# Patient Record
Sex: MALE | Race: WHITE | NOT HISPANIC OR LATINO | URBAN - METROPOLITAN AREA
[De-identification: names, ages, dates, MRNs, and addresses within clinical notes are randomized per-mention and may not be internally consistent; named-entity substitution may affect disease eponyms.]

---

## 2019-03-18 ENCOUNTER — OUTPATIENT (OUTPATIENT)
Dept: OUTPATIENT SERVICES | Facility: HOSPITAL | Age: 34
LOS: 1 days | End: 2019-03-18
Payer: COMMERCIAL

## 2019-03-18 DIAGNOSIS — E80.6 OTHER DISORDERS OF BILIRUBIN METABOLISM: ICD-10-CM

## 2019-03-18 PROCEDURE — 76857 US EXAM PELVIC LIMITED: CPT | Mod: 26

## 2019-03-18 PROCEDURE — 76700 US EXAM ABDOM COMPLETE: CPT | Mod: 26

## 2019-03-18 PROCEDURE — 76700 US EXAM ABDOM COMPLETE: CPT

## 2019-03-18 PROCEDURE — 76857 US EXAM PELVIC LIMITED: CPT

## 2020-03-09 PROBLEM — Z00.00 ENCOUNTER FOR PREVENTIVE HEALTH EXAMINATION: Status: ACTIVE | Noted: 2020-03-09

## 2020-03-10 ENCOUNTER — TRANSCRIPTION ENCOUNTER (OUTPATIENT)
Age: 35
End: 2020-03-10

## 2020-03-10 ENCOUNTER — APPOINTMENT (OUTPATIENT)
Dept: ORTHOPEDIC SURGERY | Facility: CLINIC | Age: 35
End: 2020-03-10
Payer: COMMERCIAL

## 2020-03-10 DIAGNOSIS — Z78.9 OTHER SPECIFIED HEALTH STATUS: ICD-10-CM

## 2020-03-10 DIAGNOSIS — M25.561 PAIN IN RIGHT KNEE: ICD-10-CM

## 2020-03-10 PROCEDURE — 99203 OFFICE O/P NEW LOW 30 MIN: CPT

## 2020-03-10 NOTE — HISTORY OF PRESENT ILLNESS
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 He has been having increasing pain in his right knee for some time. He has tried multiple modalities of conservative therapy including anti-inflammatories, physical therapy and rest. Despite this he continues to have pain. It is worse with sitting for long periods of time. It is relieved with rest. The patient states the pain is made worse with activity and relieved with rest.  Sharp, 5/10 [Bending] : worsened by bending [Lifting] : worsened by lifting [NSAIDs] : relieved by nonsteroidal anti-inflammatory drugs [Recumbency] : relieved by recumbency [Rest] : relieved by rest

## 2020-03-10 NOTE — DISCUSSION/SUMMARY
[de-identified] : 34-year-old male with likely ligamentous knee injury or internal derangement. Due to his failure of relative conservative therapy, I would recommend an MRI for further evaluation. He may have patellofemoral syndrome and may benefit from a corticosteroid injection, but we want to ensure there were no ligamentous injuries that may require in surgical care prior to proceeding with additional treatment. After the MRI is complete, he will give us a call for further discussion of plan of care.  The patient was given the opportunity to ask questions and all questions were answered to their satisfaction.\par \par Cuong Mclaughlin MD\par Orthopaedic Trauma Surgeon\par [facility] [place]\par Director Orthopaedic Trauma, [facility]\par \par \par \par \par

## 2020-03-10 NOTE — PHYSICAL EXAM
[de-identified] : Physical Exam:\par General: Well appearing, no acute distress, A&O\par Neurologic: A&Ox3, No focal deficits\par Head: NCAT without abrasions, lacerations, or ecchymosis to head, face, or scalp\par Respiratory: Equal chest wall expansion bilaterally, no accessory muscle use\par Lymphatic: No lymphadenopathy palpated\par Skin: Warm and dry\par Psychiatric: Normal mood and affect\par \par RLE:\par SILT s/s/sp/dp/t\par Fires EHL/FHL/GS/TA\par 2+ DP/PT pulse\par brisk capillary refill\par Moderate tenderness to palpation anterior knee. Positive pain with patellofemoral compression. No medial or lateral joint line tenderness. No instability to varus, valgus, anterior drawer, posterior drawer stress testing.

## 2020-03-16 ENCOUNTER — APPOINTMENT (OUTPATIENT)
Dept: MRI IMAGING | Facility: CLINIC | Age: 35
End: 2020-03-16

## 2020-04-26 ENCOUNTER — MESSAGE (OUTPATIENT)
Age: 35
End: 2020-04-26

## 2020-05-01 ENCOUNTER — APPOINTMENT (OUTPATIENT)
Dept: DISASTER EMERGENCY | Facility: HOSPITAL | Age: 35
End: 2020-05-01

## 2020-05-02 LAB
SARS-COV-2 IGG SERPL IA-ACNC: 0.3 INDEX
SARS-COV-2 IGG SERPL QL IA: NEGATIVE

## 2020-05-05 ENCOUNTER — APPOINTMENT (OUTPATIENT)
Dept: DISASTER EMERGENCY | Facility: HOSPITAL | Age: 35
End: 2020-05-05

## 2021-03-28 ENCOUNTER — EMERGENCY (EMERGENCY)
Facility: HOSPITAL | Age: 36
LOS: 1 days | Discharge: HOME | End: 2021-03-28
Payer: COMMERCIAL

## 2021-03-28 VITALS
RESPIRATION RATE: 16 BRPM | HEART RATE: 63 BPM | TEMPERATURE: 98 F | HEIGHT: 77 IN | DIASTOLIC BLOOD PRESSURE: 63 MMHG | SYSTOLIC BLOOD PRESSURE: 101 MMHG | OXYGEN SATURATION: 99 %

## 2021-03-28 LAB
FLU A RESULT: SIGNIFICANT CHANGE UP
FLU A RESULT: SIGNIFICANT CHANGE UP
FLUAV AG NPH QL: SIGNIFICANT CHANGE UP
FLUBV AG NPH QL: SIGNIFICANT CHANGE UP
RSV RESULT: SIGNIFICANT CHANGE UP
RSV RNA RESP QL NAA+PROBE: SIGNIFICANT CHANGE UP
SARS-COV-2 RNA SPEC QL NAA+PROBE: SIGNIFICANT CHANGE UP

## 2021-03-28 PROCEDURE — 99284 EMERGENCY DEPT VISIT MOD MDM: CPT

## 2021-03-28 PROCEDURE — 87631 RESP VIRUS 3-5 TARGETS: CPT

## 2021-03-28 PROCEDURE — U0005: CPT

## 2021-03-28 PROCEDURE — U0003: CPT

## 2021-03-28 PROCEDURE — 99283 EMERGENCY DEPT VISIT LOW MDM: CPT

## 2021-03-28 NOTE — ED PROVIDER NOTE - OBJECTIVE STATEMENT
Pt is presenting to the ER for covid swab. Pt reports cough this morning and someone at home has + covid. Denies fevers chills, loss of taste or smell, denies URI symptoms, denies chest pain or shortness of breath, denies nausea vomiting diarrhea or abdominal pain, and denies weakness or fatigue. Pt requesting testing at this time. [x] known exposure [] no-known exposure

## 2021-03-28 NOTE — ED PROVIDER NOTE - CLINICAL SUMMARY MEDICAL DECISION MAKING FREE TEXT BOX
Pt nontoxic appearing, stable vitals, ambulatory with stable saturation without supplemental oxygen. PT does not meet criteria listed in most updated guidelines as per [facility] protocol/algorithm for admission at this time. pt advised about self-quarantine instructions until negative test results and/or symptom resolution. pt advised on hand hygiene, monitoring of symptoms, antipyretic use as well as and fu with primary care provider. Instructions given in pre-printed copy.

## 2021-03-28 NOTE — ED PROVIDER NOTE - NS ED ROS FT
+ cough  Denies fever, chills, fatigue, and weight loss. Denies HA, Dizziness. ENMT: Denies URI symptoms, difficulty swallowing, sore throat, loss of taste or smell. CARDIO: Denies CP, palpitations, edema. RESP: Denies SOB, Diff breathing, hemoptysis. GI: Denies N/V, ABD pain, change in bowel movement.. MS: Denies joint pain, back pain, weakness, decreased ROM, swelling. NEURO: Denies change in gait, seizures, loss of sensation, dizziness, confusion LOC. SKIN: denies rash or discoloration

## 2021-03-28 NOTE — ED PROVIDER NOTE - PATIENT PORTAL LINK FT
You can access the FollowMyHealth Patient Portal offered by [facility] by registering at the following website: [email or web address]. By joining [facility]’s FollowMyHealth portal, you will also be able to view your health information using other applications (apps) compatible with our system.

## 2022-03-03 ENCOUNTER — APPOINTMENT (OUTPATIENT)
Dept: ORTHOPEDIC SURGERY | Facility: CLINIC | Age: 37
End: 2022-03-03
Payer: COMMERCIAL

## 2022-03-03 DIAGNOSIS — S93.491A SPRAIN OF OTHER LIGAMENT OF RIGHT ANKLE, INITIAL ENCOUNTER: ICD-10-CM

## 2022-03-03 PROCEDURE — 99213 OFFICE O/P EST LOW 20 MIN: CPT

## 2022-03-03 NOTE — HISTORY OF PRESENT ILLNESS
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  He comes in today for evaluation.  He has been ambulating in a cam boot but finds it somewhat cumbersome.  The patient states the pain is made worse with activity and relieved with rest.  Sharp, 3 out of 10 [3] : a current pain level of 3/10 [Walking] : walking [Bending] : worsened by bending [Lifting] : worsened by lifting [Weight Bearing] : worsened by weight bearing [Recumbency] : relieved by recumbency [Rest] : relieved by rest

## 2022-03-03 NOTE — DISCUSSION/SUMMARY
[de-identified] : 36-year-old male with right ankle sprain.  The patient presents with an acute inversion injury to the right ankle with an injury to the anterior lateral ligamentous complex. I outlined a treatment protocol that will require relative rest over the next 2 weeks. In addition, I discussed the spectrum of sprain injuries; the majority of which responded well to nonoperative modalities of treatment, and the possible indication for surgical management if the ankle becomes chronically and grossly unstable. Nonoperative modalities of treatment include relative rest over the next 2 weeks, NSAID's, ice, compressive wraps and gradual return to weight bearing.\par \par As he is still having some issues, he was given a lace up ankle brace today for some support that he can wear while he works.  If he is still having trouble in a couple weeks, we would likely recommend formal physical therapy for balance/proprioceptive training if he is still struggling with his injury. At this time, radiographs show no evidence of fracture, and I have recommended weightbearing as tolerated. We'll see the patient back as needed to determine further treatment modalities if needed.\par \par The patient was given the opportunity to ask questions and all questions were answered to their satisfaction.\par \par Cuong Mclaughlin MD\par Orthopaedic Trauma Surgeon\par [facility]\par [facility] [place]\par Director Orthopaedic Trauma, [facility]\par \par \par \par

## 2022-03-03 NOTE — PHYSICAL EXAM
[de-identified] : Physical Exam:\par General: Well appearing, no acute distress, A&O\par Neurologic: A&Ox3, No focal deficits\par Head: NCAT without abrasions, lacerations, or ecchymosis to head, face, or scalp\par Respiratory: Equal chest wall expansion bilaterally, no accessory muscle use\par Lymphatic: No lymphadenopathy palpated\par Skin: Warm and dry\par Psychiatric: Normal mood and affect\par \par RLE:\par SILT s/s/sp/dp/t\par Fires EHL/FHL/GS/TA\par 2+ DP/PT pulse\par brisk capillary refill\par Moderate tenderness to palpation anterolateral ankle.  [de-identified] : no imaging today

## 2022-07-22 ENCOUNTER — APPOINTMENT (OUTPATIENT)
Dept: SURGERY | Facility: CLINIC | Age: 37
End: 2022-07-22

## 2022-07-22 VITALS
BODY MASS INDEX: 24.79 KG/M2 | HEIGHT: 77 IN | HEART RATE: 66 BPM | OXYGEN SATURATION: 99 % | SYSTOLIC BLOOD PRESSURE: 136 MMHG | WEIGHT: 210 LBS | DIASTOLIC BLOOD PRESSURE: 70 MMHG

## 2022-07-22 DIAGNOSIS — K42.9 UMBILICAL HERNIA W/OUT OBSTRUCTION OR GANGRENE: ICD-10-CM

## 2022-07-22 PROCEDURE — 99204 OFFICE O/P NEW MOD 45 MIN: CPT

## 2022-07-22 NOTE — PHYSICAL EXAM
[Normal Thyroid] : the thyroid was normal [Normal Breath Sounds] : Normal breath sounds [Normal Heart Sounds] : normal heart sounds [Normal Rate and Rhythm] : normal rate and rhythm [No HSM] : no hepatosplenomegaly [No Rash or Lesion] : No rash or lesion [Alert] : alert [Oriented to Person] : oriented to person [Oriented to Place] : oriented to place [Oriented to Time] : oriented to time [Calm] : calm [de-identified] : well-appearing, NAD [de-identified] : ERIC [de-identified] : soft, ND, NT, +BS, +1cm reducible umbilical hernia [de-identified] : no CVAT [de-identified] : full ROM

## 2022-07-22 NOTE — HISTORY OF PRESENT ILLNESS
[de-identified] : 37yo Male with h/o hypothyroidism, mild ANTOINE, presents for evaluation of umbilical hernia. States that he has had the hernia for years and he is always able to reduce it. No episodes of incarceration or bowel obstruction. No previous surgery.\par No tobacco use. No DM.\par No immunosuppression.\par No chronic cough.

## 2022-07-22 NOTE — ASSESSMENT
[FreeTextEntry1] : 36M with symptomatic, reducible 1cm umbilical hernia. Recommend open primary repair as an outpatient.

## 2022-07-22 NOTE — REVIEW OF SYSTEMS
[Fever] : no fever [Chills] : no chills [Eye Pain] : no eye pain [Red Eyes] : eyes not red [Earache] : no earache [Loss Of Hearing] : no hearing loss [Nosebleeds] : no nosebleeds [Heart Rate Is Slow] : the heart rate was not slow [Heart Rate Is Fast] : the heart rate was not fast [Chest Pain] : no chest pain [Palpitations] : no palpitations [Shortness Of Breath] : no shortness of breath [Wheezing] : no wheezing [Cough] : no cough [SOB on Exertion] : no shortness of breath during exertion [Abdominal Pain] : no abdominal pain [Vomiting] : no vomiting [Constipation] : no constipation [Diarrhea] : no diarrhea [Heartburn] : no heartburn [Dysuria] : no dysuria [Incontinence] : no incontinence [Joint Swelling] : no joint swelling [Joint Stiffness] : no joint stiffness [Skin Lesions] : no skin lesions [Skin Wound] : no skin wound [Confused] : no confusion [Convulsions] : no convulsions [Suicidal] : not suicidal [Sleep Disturbances] : no sleep disturbances [Proptosis] : no proptosis [Hot Flashes] : no hot flashes [Easy Bleeding] : no tendency for easy bleeding [Easy Bruising] : no tendency for easy bruising

## 2022-08-12 ENCOUNTER — OUTPATIENT (OUTPATIENT)
Dept: OUTPATIENT SERVICES | Facility: HOSPITAL | Age: 37
LOS: 1 days | End: 2022-08-12
Payer: COMMERCIAL

## 2022-08-12 VITALS
HEART RATE: 76 BPM | HEIGHT: 77.5 IN | SYSTOLIC BLOOD PRESSURE: 108 MMHG | WEIGHT: 216.93 LBS | RESPIRATION RATE: 20 BRPM | OXYGEN SATURATION: 99 % | DIASTOLIC BLOOD PRESSURE: 60 MMHG

## 2022-08-12 DIAGNOSIS — Z29.9 ENCOUNTER FOR PROPHYLACTIC MEASURES, UNSPECIFIED: ICD-10-CM

## 2022-08-12 DIAGNOSIS — Z91.89 OTHER SPECIFIED PERSONAL RISK FACTORS, NOT ELSEWHERE CLASSIFIED: ICD-10-CM

## 2022-08-12 DIAGNOSIS — Z98.890 OTHER SPECIFIED POSTPROCEDURAL STATES: Chronic | ICD-10-CM

## 2022-08-12 DIAGNOSIS — G47.33 OBSTRUCTIVE SLEEP APNEA (ADULT) (PEDIATRIC): ICD-10-CM

## 2022-08-12 DIAGNOSIS — K42.9 UMBILICAL HERNIA WITHOUT OBSTRUCTION OR GANGRENE: ICD-10-CM

## 2022-08-12 DIAGNOSIS — Z01.818 ENCOUNTER FOR OTHER PREPROCEDURAL EXAMINATION: ICD-10-CM

## 2022-08-12 DIAGNOSIS — E03.9 HYPOTHYROIDISM, UNSPECIFIED: ICD-10-CM

## 2022-08-12 LAB
A1C WITH ESTIMATED AVERAGE GLUCOSE RESULT: 5.3 % — SIGNIFICANT CHANGE UP (ref 4–5.6)
ALBUMIN SERPL ELPH-MCNC: 4.6 G/DL — SIGNIFICANT CHANGE UP (ref 3.3–5.2)
ALP SERPL-CCNC: 57 U/L — SIGNIFICANT CHANGE UP (ref 40–120)
ALT FLD-CCNC: 21 U/L — SIGNIFICANT CHANGE UP
ANION GAP SERPL CALC-SCNC: 10 MMOL/L — SIGNIFICANT CHANGE UP (ref 5–17)
APTT BLD: 28.6 SEC — SIGNIFICANT CHANGE UP (ref 27.5–35.5)
AST SERPL-CCNC: 23 U/L — SIGNIFICANT CHANGE UP
BASOPHILS # BLD AUTO: 0.03 K/UL — SIGNIFICANT CHANGE UP (ref 0–0.2)
BASOPHILS NFR BLD AUTO: 0.4 % — SIGNIFICANT CHANGE UP (ref 0–2)
BILIRUB SERPL-MCNC: 1.4 MG/DL — SIGNIFICANT CHANGE UP (ref 0.4–2)
BLD GP AB SCN SERPL QL: SIGNIFICANT CHANGE UP
BUN SERPL-MCNC: 26.4 MG/DL — HIGH (ref 8–20)
CALCIUM SERPL-MCNC: 9.5 MG/DL — SIGNIFICANT CHANGE UP (ref 8.4–10.5)
CHLORIDE SERPL-SCNC: 100 MMOL/L — SIGNIFICANT CHANGE UP (ref 98–107)
CO2 SERPL-SCNC: 29 MMOL/L — SIGNIFICANT CHANGE UP (ref 22–29)
CREAT SERPL-MCNC: 1.23 MG/DL — SIGNIFICANT CHANGE UP (ref 0.5–1.3)
EGFR: 79 ML/MIN/1.73M2 — SIGNIFICANT CHANGE UP
EOSINOPHIL # BLD AUTO: 0.18 K/UL — SIGNIFICANT CHANGE UP (ref 0–0.5)
EOSINOPHIL NFR BLD AUTO: 2.6 % — SIGNIFICANT CHANGE UP (ref 0–6)
ESTIMATED AVERAGE GLUCOSE: 105 MG/DL — SIGNIFICANT CHANGE UP (ref 68–114)
GLUCOSE SERPL-MCNC: 95 MG/DL — SIGNIFICANT CHANGE UP (ref 70–99)
HCT VFR BLD CALC: 44.3 % — SIGNIFICANT CHANGE UP (ref 39–50)
HGB BLD-MCNC: 14.9 G/DL — SIGNIFICANT CHANGE UP (ref 13–17)
IMM GRANULOCYTES NFR BLD AUTO: 0.4 % — SIGNIFICANT CHANGE UP (ref 0–1.5)
INR BLD: 1.16 RATIO — SIGNIFICANT CHANGE UP (ref 0.88–1.16)
LYMPHOCYTES # BLD AUTO: 1.67 K/UL — SIGNIFICANT CHANGE UP (ref 1–3.3)
LYMPHOCYTES # BLD AUTO: 24.5 % — SIGNIFICANT CHANGE UP (ref 13–44)
MCHC RBC-ENTMCNC: 30.3 PG — SIGNIFICANT CHANGE UP (ref 27–34)
MCHC RBC-ENTMCNC: 33.6 GM/DL — SIGNIFICANT CHANGE UP (ref 32–36)
MCV RBC AUTO: 90 FL — SIGNIFICANT CHANGE UP (ref 80–100)
MONOCYTES # BLD AUTO: 0.48 K/UL — SIGNIFICANT CHANGE UP (ref 0–0.9)
MONOCYTES NFR BLD AUTO: 7 % — SIGNIFICANT CHANGE UP (ref 2–14)
NEUTROPHILS # BLD AUTO: 4.44 K/UL — SIGNIFICANT CHANGE UP (ref 1.8–7.4)
NEUTROPHILS NFR BLD AUTO: 65.1 % — SIGNIFICANT CHANGE UP (ref 43–77)
PLATELET # BLD AUTO: 184 K/UL — SIGNIFICANT CHANGE UP (ref 150–400)
POTASSIUM SERPL-MCNC: 4 MMOL/L — SIGNIFICANT CHANGE UP (ref 3.5–5.3)
POTASSIUM SERPL-SCNC: 4 MMOL/L — SIGNIFICANT CHANGE UP (ref 3.5–5.3)
PROT SERPL-MCNC: 7 G/DL — SIGNIFICANT CHANGE UP (ref 6.6–8.7)
PROTHROM AB SERPL-ACNC: 13.5 SEC — HIGH (ref 10.5–13.4)
RBC # BLD: 4.92 M/UL — SIGNIFICANT CHANGE UP (ref 4.2–5.8)
RBC # FLD: 12.4 % — SIGNIFICANT CHANGE UP (ref 10.3–14.5)
SODIUM SERPL-SCNC: 139 MMOL/L — SIGNIFICANT CHANGE UP (ref 135–145)
T3 SERPL-MCNC: 71 NG/DL — LOW (ref 80–200)
T4 AB SER-ACNC: 7.8 UG/DL — SIGNIFICANT CHANGE UP (ref 4.5–12)
TSH SERPL-MCNC: 0.85 UIU/ML — SIGNIFICANT CHANGE UP (ref 0.27–4.2)
WBC # BLD: 6.83 K/UL — SIGNIFICANT CHANGE UP (ref 3.8–10.5)
WBC # FLD AUTO: 6.83 K/UL — SIGNIFICANT CHANGE UP (ref 3.8–10.5)

## 2022-08-12 PROCEDURE — G0463: CPT

## 2022-08-12 RX ORDER — MUPIROCIN 20 MG/G
1 OINTMENT TOPICAL
Qty: 1 | Refills: 0
Start: 2022-08-12 | End: 2022-08-16

## 2022-08-12 NOTE — H&P PST ADULT - PROBLEM SELECTOR PLAN 3
Medical management as indicated. Pt. states he is in the process of finding PCP. Surgical team to follow.

## 2022-08-12 NOTE — H&P PST ADULT - GASTROINTESTINAL
negative small umbilical hernia/normal/soft/nontender/nondistended/normal active bowel sounds/no guarding/no rigidity/no organomegaly/no palpable keren/no masses palpable details…

## 2022-08-12 NOTE — H&P PST ADULT - MUSCULOSKELETAL
details… normal/ROM intact/no joint swelling/no joint erythema/no joint warmth/no calf tenderness/normal gait/strength 5/5 bilateral upper extremities/strength 5/5 bilateral lower extremities

## 2022-08-12 NOTE — H&P PST ADULT - NSICDXPASTMEDICALHX_GEN_ALL_CORE_FT
PAST MEDICAL HISTORY:  Hypothyroidism     Obstructive sleep apnea CPAP     PAST MEDICAL HISTORY:  COVID-19 vaccine series completed     Hypothyroidism     Obstructive sleep apnea CPAP    Umbilical hernia without obstruction or gangrene

## 2022-08-12 NOTE — H&P PST ADULT - NEUROLOGICAL
negative normal/cranial nerves II-XII intact/sensation intact/responds to verbal commands/cranial nerves intact/no spontaneous movement/superficial reflexes intact

## 2022-08-12 NOTE — H&P PST ADULT - NEGATIVE MUSCULOSKELETAL SYMPTOMS
no arthralgia/no arthritis/no joint swelling/no myalgia/no muscle weakness/no stiffness/no neck pain/no arm pain L/no arm pain R/no back pain/no leg pain L/no leg pain R

## 2022-08-12 NOTE — H&P PST ADULT - PROBLEM SELECTOR PLAN 5
ORT = 1. Surgical team to follow. Medical management as indicated. Pt. states he is in the process of finding PCP

## 2022-08-12 NOTE — H&P PST ADULT - PROBLEM SELECTOR PLAN 2
Open umbilical hernia repair with  Dr. Filippo Ellsworth on 8/17/22 secondary to umbilical hernia without obstruction or gangrene.

## 2022-08-12 NOTE — H&P PST ADULT - NSICDXFAMILYHX_GEN_ALL_CORE_FT
FAMILY HISTORY:  Mother  Still living? Yes, Estimated age: Age Unknown  FH: atrial fibrillation, Age at diagnosis: Age Unknown  FH: breast cancer, Age at diagnosis: Age Unknown  FH: hypertension, Age at diagnosis: Age Unknown

## 2022-08-12 NOTE — H&P PST ADULT - HISTORY OF PRESENT ILLNESS
37 y/o male presents today to PST pending open umbilical hernia repair with  Dr. Filippo Ellsworth on 8/17/22 secondary to umbilical hernia without obstruction or gangrene. Pt. states he has had hernia for a few years getting slightly worse over past few years and that it has been worsening since then and he has decided to pursue [id number] procedure. Pt. states he has ANTOINE, on CPAP, hypothyroidism on synthroid. Pt. denies incontinence of bowel or bladder.

## 2022-08-12 NOTE — H&P PST ADULT - RESPIRATORY
normal/clear to auscultation bilaterally/no wheezes/no rales/no rhonchi/no respiratory distress/no use of accessory muscles/airway patent/breath sounds equal/good air movement/respirations non-labored/no intercostal retractions

## 2022-08-12 NOTE — H&P PST ADULT - ASSESSMENT
37 y/o male presents today to PST pending open umbilical hernia repair with  Dr. Filippo Ellsworth on 22 secondary to umbilical hernia without obstruction or gangrene. Pt. states he has had hernia for a few years getting slightly worse over past few years and that it has been worsening since then and he has decided to pursue aforementioned procedure. Pt. states he has ANTOINE, on CPAP, hypothyroidism on synthroid. Pt. denies incontinence of bowel or bladder.   BMI 25.4.    Pt. aware that if any significant abnormalities come back on labs he would need medical clearance, pt. verbalized agreement and understanding.   Pt. educated and instructed regarding all preoperative instructions and education as per policy via both verbal and written means of communication and pt. verbalized agreement and understanding.  Pt. advised mupirocin ointment to be e-scribed to pharmacy prophylactically as documented and ordered, if MRSA/MSSA negative it can be stopped, and pt. verbalized agreement and understanding.  Patient educated on surgical scrub, COVID testing-done today in PST and pending, preadmission instructions, and day of procedure medications as per policy and pt. verbalizes understanding and agreement.    OPIOID RISK TOOL    HERBERT EACH BOX THAT APPLIES AND ADD TOTALS AT THE END    FAMILY HISTORY OF SUBSTANCE ABUSE                 FEMALE         MALE                                                Alcohol                             [  ]1 pt          [  ]3pts                                               Illegal Durgs                     [  ]2 pts        [  ]3pts                                               Rx Drugs                           [  ]4 pts        [  ]4 pts    PERSONAL HISTORY OF SUBSTANCE ABUSE                                                                                          Alcohol                             [  ]3 pts       [  ]3 pts                                               Illegal Drugs                     [  ]4 pts        [  ]4 pts                                               Rx Drugs                           [  ]5 pts        [  ]5 pts    AGE BETWEEN 16-45 YEARS                                      [  ]1 pt         [ x ]1 pt    HISTORY OF PREADOLESCENT   SEXUAL ABUSE                                                             [  ]3 pts        [  ]0pts    PSYCHOLOGICAL DISEASE                     ADD, OCD, Bipolar, Schizophrenia        [  ]2 pts         [  ]2 pts                      Depression                                               [  ]1 pt           [  ]1 pt           SCORING TOTAL   (add numbers and type here)              (1)                                     A score of 3 or lower indicated LOW risk for future opioid abuse  A score of 4 to 7 indicated moderate risk for future opioid abuse  A score of 8 or higher indicates a high risk for opioid abuse    CAPRINI SCORE    AGE RELATED RISK FACTORS                                                             [ ] Age 41-60 years                                            (1 Point)  [ ] Age: 61-74 years                                           (2 Points)                 [ ] Age= 75 years                                                (3 Points)             DISEASE RELATED RISK FACTORS                                                       [ ] Edema in the lower extremities                 (1 Point)                     [ ] Varicose veins                                               (1 Point)                                 [x ] BMI > 25 Kg/m2                                            (1 Point)                                  [ ] Serious infection (ie PNA)                            (1 Point)                     [ ] Lung disease ( COPD, Emphysema)            (1 Point)                                                                          [ ] Acute myocardial infarction                         (1 Point)                  [ ] Congestive heart failure (in the previous month)  (1 Point)         [ ] Inflammatory bowel disease                            (1 Point)                  [ ] Central venous access, PICC or Port               (2 points)       (within the last month)                                                                [ ] Stroke (in the previous month)                        (5 Points)    [ ] Previous or present malignancy                       (2 points)                                                                                                                                                         HEMATOLOGY RELATED FACTORS                                                         [ ] Prior episodes of VTE                                     (3 Points)                     [ ] Positive family history for VTE                      (3 Points)                  [ ] Prothrombin 09856 A                                     (3 Points)                     [ ] Factor V Leiden                                                (3 Points)                        [ ] Lupus anticoagulants                                      (3 Points)                                                           [ ] Anticardiolipin antibodies                              (3 Points)                                                       [ ] High homocysteine in the blood                   (3 Points)                                             [ ] Other congenital or acquired thrombophilia      (3 Points)                                                [ ] Heparin induced thrombocytopenia                  (3 Points)                                        MOBILITY RELATED FACTORS  [ ] Bed rest                                                         (1 Point)  [ ] Plaster cast                                                    (2 points)  [ ] Bed bound for more than 72 hours           (2 Points)    GENDER SPECIFIC FACTORS  [ ] Pregnancy or had a baby within the last month   (1 Point)  [ ] Post-partum < 6 weeks                                   (1 Point)  [ ] Hormonal therapy  or oral contraception   (1 Point)  [ ] History of pregnancy complications              (1 point)  [ ] Unexplained or recurrent              (1 Point)    OTHER RISK FACTORS                                           (1 Point)  [ ] BMI >40, smoking, diabetes requiring insulin, chemotherapy  blood transfusions and length of surgery over 2 hours    SURGERY RELATED RISK FACTORS  [ ]  Section within the last month     (1 Point)  [ ] Minor surgery                                                  (1 Point)  [ ] Arthroscopic surgery                                       (2 Points)  [x ] Planned major surgery lasting more            (2 Points)      than 45 minutes     [ ] Elective hip or knee joint replacement       (5 points)       surgery                                                TRAUMA RELATED RISK FACTORS  [ ] Fracture of the hip, pelvis, or leg                       (5 Points)  [ ] Spinal cord injury resulting in paralysis             (5 points)       (in the previous month)    [ ] Paralysis  (less than 1 month)                             (5 Points)  [ ] Multiple Trauma within 1 month                        (5 Points)    Total Score [        ]    Caprini Score 0-2: Low Risk, NO VTE prophylaxis required for most patients, encourage ambulation  Caprini Score 3-6: Moderate Risk , pharmacologic VTE prophylaxis is indicated for most patients (in the absence of contraindications)  Caprini Score Greater than or =7: High risk, pharmocologic VTE prophylaxis indicated for most patients (in the absence of contraindications)

## 2022-08-13 LAB
MRSA PCR RESULT.: SIGNIFICANT CHANGE UP
S AUREUS DNA NOSE QL NAA+PROBE: SIGNIFICANT CHANGE UP
SARS-COV-2 RNA SPEC QL NAA+PROBE: SIGNIFICANT CHANGE UP

## 2022-08-17 ENCOUNTER — APPOINTMENT (OUTPATIENT)
Dept: SURGERY | Facility: HOSPITAL | Age: 37
End: 2022-08-17

## 2022-12-08 PROBLEM — G47.33 OBSTRUCTIVE SLEEP APNEA (ADULT) (PEDIATRIC): Chronic | Status: ACTIVE | Noted: 2022-08-12

## 2022-12-08 PROBLEM — Z92.29 PERSONAL HISTORY OF OTHER DRUG THERAPY: Chronic | Status: ACTIVE | Noted: 2022-08-12

## 2022-12-08 PROBLEM — E03.9 HYPOTHYROIDISM, UNSPECIFIED: Chronic | Status: ACTIVE | Noted: 2022-08-12

## 2022-12-08 PROBLEM — K42.9 UMBILICAL HERNIA WITHOUT OBSTRUCTION OR GANGRENE: Chronic | Status: ACTIVE | Noted: 2022-08-12

## 2022-12-22 DIAGNOSIS — Z01.818 ENCOUNTER FOR OTHER PREPROCEDURAL EXAMINATION: ICD-10-CM

## 2023-01-20 ENCOUNTER — OUTPATIENT (OUTPATIENT)
Dept: OUTPATIENT SERVICES | Facility: HOSPITAL | Age: 38
LOS: 1 days | End: 2023-01-20
Payer: COMMERCIAL

## 2023-01-20 VITALS
WEIGHT: 217.6 LBS | HEART RATE: 56 BPM | DIASTOLIC BLOOD PRESSURE: 80 MMHG | TEMPERATURE: 99 F | OXYGEN SATURATION: 98 % | SYSTOLIC BLOOD PRESSURE: 120 MMHG | RESPIRATION RATE: 18 BRPM | HEIGHT: 77 IN

## 2023-01-20 DIAGNOSIS — Z01.818 ENCOUNTER FOR OTHER PREPROCEDURAL EXAMINATION: ICD-10-CM

## 2023-01-20 DIAGNOSIS — Z98.890 OTHER SPECIFIED POSTPROCEDURAL STATES: Chronic | ICD-10-CM

## 2023-01-20 DIAGNOSIS — K42.9 UMBILICAL HERNIA WITHOUT OBSTRUCTION OR GANGRENE: ICD-10-CM

## 2023-01-20 DIAGNOSIS — Z29.9 ENCOUNTER FOR PROPHYLACTIC MEASURES, UNSPECIFIED: ICD-10-CM

## 2023-01-20 DIAGNOSIS — E03.9 HYPOTHYROIDISM, UNSPECIFIED: ICD-10-CM

## 2023-01-20 DIAGNOSIS — Z13.89 ENCOUNTER FOR SCREENING FOR OTHER DISORDER: ICD-10-CM

## 2023-01-20 LAB
A1C WITH ESTIMATED AVERAGE GLUCOSE RESULT: 5.4 % — SIGNIFICANT CHANGE UP (ref 4–5.6)
ALBUMIN SERPL ELPH-MCNC: 4.5 G/DL — SIGNIFICANT CHANGE UP (ref 3.3–5.2)
ALP SERPL-CCNC: 50 U/L — SIGNIFICANT CHANGE UP (ref 40–120)
ALT FLD-CCNC: 21 U/L — SIGNIFICANT CHANGE UP
ANION GAP SERPL CALC-SCNC: 9 MMOL/L — SIGNIFICANT CHANGE UP (ref 5–17)
APTT BLD: 30.5 SEC — SIGNIFICANT CHANGE UP (ref 27.5–35.5)
AST SERPL-CCNC: 20 U/L — SIGNIFICANT CHANGE UP
BASOPHILS # BLD AUTO: 0.03 K/UL — SIGNIFICANT CHANGE UP (ref 0–0.2)
BASOPHILS NFR BLD AUTO: 0.4 % — SIGNIFICANT CHANGE UP (ref 0–2)
BILIRUB SERPL-MCNC: 1.5 MG/DL — SIGNIFICANT CHANGE UP (ref 0.4–2)
BLD GP AB SCN SERPL QL: SIGNIFICANT CHANGE UP
BUN SERPL-MCNC: 19.8 MG/DL — SIGNIFICANT CHANGE UP (ref 8–20)
CALCIUM SERPL-MCNC: 9.1 MG/DL — SIGNIFICANT CHANGE UP (ref 8.4–10.5)
CHLORIDE SERPL-SCNC: 103 MMOL/L — SIGNIFICANT CHANGE UP (ref 96–108)
CO2 SERPL-SCNC: 28 MMOL/L — SIGNIFICANT CHANGE UP (ref 22–29)
CREAT SERPL-MCNC: 0.98 MG/DL — SIGNIFICANT CHANGE UP (ref 0.5–1.3)
EGFR: 102 ML/MIN/1.73M2 — SIGNIFICANT CHANGE UP
EOSINOPHIL # BLD AUTO: 0.18 K/UL — SIGNIFICANT CHANGE UP (ref 0–0.5)
EOSINOPHIL NFR BLD AUTO: 2.3 % — SIGNIFICANT CHANGE UP (ref 0–6)
ESTIMATED AVERAGE GLUCOSE: 108 MG/DL — SIGNIFICANT CHANGE UP (ref 68–114)
GLUCOSE SERPL-MCNC: 82 MG/DL — SIGNIFICANT CHANGE UP (ref 70–99)
HCT VFR BLD CALC: 45.6 % — SIGNIFICANT CHANGE UP (ref 39–50)
HGB BLD-MCNC: 15.3 G/DL — SIGNIFICANT CHANGE UP (ref 13–17)
IMM GRANULOCYTES NFR BLD AUTO: 0.3 % — SIGNIFICANT CHANGE UP (ref 0–0.9)
INR BLD: 1.22 RATIO — HIGH (ref 0.88–1.16)
LYMPHOCYTES # BLD AUTO: 2.18 K/UL — SIGNIFICANT CHANGE UP (ref 1–3.3)
LYMPHOCYTES # BLD AUTO: 27.3 % — SIGNIFICANT CHANGE UP (ref 13–44)
MCHC RBC-ENTMCNC: 29.8 PG — SIGNIFICANT CHANGE UP (ref 27–34)
MCHC RBC-ENTMCNC: 33.6 GM/DL — SIGNIFICANT CHANGE UP (ref 32–36)
MCV RBC AUTO: 88.7 FL — SIGNIFICANT CHANGE UP (ref 80–100)
MONOCYTES # BLD AUTO: 0.52 K/UL — SIGNIFICANT CHANGE UP (ref 0–0.9)
MONOCYTES NFR BLD AUTO: 6.5 % — SIGNIFICANT CHANGE UP (ref 2–14)
NEUTROPHILS # BLD AUTO: 5.06 K/UL — SIGNIFICANT CHANGE UP (ref 1.8–7.4)
NEUTROPHILS NFR BLD AUTO: 63.2 % — SIGNIFICANT CHANGE UP (ref 43–77)
PLATELET # BLD AUTO: 199 K/UL — SIGNIFICANT CHANGE UP (ref 150–400)
POTASSIUM SERPL-MCNC: 4 MMOL/L — SIGNIFICANT CHANGE UP (ref 3.5–5.3)
POTASSIUM SERPL-SCNC: 4 MMOL/L — SIGNIFICANT CHANGE UP (ref 3.5–5.3)
PROT SERPL-MCNC: 7 G/DL — SIGNIFICANT CHANGE UP (ref 6.6–8.7)
PROTHROM AB SERPL-ACNC: 14.2 SEC — HIGH (ref 10.5–13.4)
RBC # BLD: 5.14 M/UL — SIGNIFICANT CHANGE UP (ref 4.2–5.8)
RBC # FLD: 12.3 % — SIGNIFICANT CHANGE UP (ref 10.3–14.5)
SODIUM SERPL-SCNC: 140 MMOL/L — SIGNIFICANT CHANGE UP (ref 135–145)
T3 SERPL-MCNC: 71 NG/DL — LOW (ref 80–200)
T4 AB SER-ACNC: 7.2 UG/DL — SIGNIFICANT CHANGE UP (ref 4.5–12)
TSH SERPL-MCNC: 2.46 UIU/ML — SIGNIFICANT CHANGE UP (ref 0.27–4.2)
WBC # BLD: 7.99 K/UL — SIGNIFICANT CHANGE UP (ref 3.8–10.5)
WBC # FLD AUTO: 7.99 K/UL — SIGNIFICANT CHANGE UP (ref 3.8–10.5)

## 2023-01-20 PROCEDURE — G0463: CPT

## 2023-01-20 RX ORDER — LEVOTHYROXINE SODIUM 125 MCG
1 TABLET ORAL
Qty: 0 | Refills: 0 | DISCHARGE

## 2023-01-20 NOTE — H&P PST ADULT - PROBLEM SELECTOR PLAN 1
Labs and EKG performed  Scheduled for Open Umbilical Hernia Repair Dr. Ellsworth, 02.07.2023  Written and verbal education provided  Patient educated on surgical scrub, COVID testing, preadmission instructions, staph screening protocol  Patient instructed to stop ASA/Herbals and anti-inflammatory  Patient verbalized understandings of all instructions/education, teach back method utilized Labs, MRSA/MSSA and EKG performed  Scheduled for Open Umbilical Hernia Repair Dr. Ellsworth, 02.07.2023  Written and verbal education provided  Patient educated on surgical scrub, COVID testing, preadmission instructions, staph screening protocol  Patient instructed to stop ASA/Herbals and anti-inflammatory.  Patient verbalized understandings of all instructions/education, teach back method utilized

## 2023-01-20 NOTE — H&P PST ADULT - ASSESSMENT
Pt is a 36 y/o male, PMH hypothyroidism, b/l inguinal hernia s/p repair as child, presents to PST, scheduled for Open Umbilical Hernia Repair Dr. Ellsworth.  Anthony explains originally noticing umbilical hernia without inciting incident several years ago, denies pain/discomfort to site, however electing for intervention as progressively becoming larger, f/u with Dr. Ellsworth procedure scheduled for 2.7.23.  Pt reports feeling generally well, denies abdominal pain, tenderness to hernia site, n/v, change in bowel pattern, hematochezia, denies recent fever/cough, s/s infection or antibiotic use.    Pt is a 38 y/o male, PMH hypothyroidism, b/l inguinal hernia s/p repair as child, presents to PST, scheduled for Open Umbilical Hernia Repair Dr. Ellsworth.  Anthony explains originally noticing umbilical hernia without inciting incident several years ago, denies pain/discomfort to site, however electing for intervention as progressively becoming larger, f/u with Dr. Ellsworth procedure scheduled for 23.  Pt reports feeling generally well, denies abdominal pain, tenderness to hernia site, n/v, change in bowel pattern, hematochezia, denies recent fever/cough, s/s infection or antibiotic use.     CAPRINI SCORE    AGE RELATED RISK FACTORS                                                             [ ] Age 41-60 years                                            (1 Point)  [ ] Age: 61-74 years                                           (2 Points)                 [ ] Age= 75 years                                                (3 Points)             DISEASE RELATED RISK FACTORS                                                       [ ] Edema in the lower extremities                 (1 Point)                     [ ] Varicose veins                                               (1 Point)                                 [X ] BMI > 25 Kg/m2                                            (1 Point)                                  [ ] Serious infection (ie PNA)                            (1 Point)                     [ ] Lung disease ( COPD, Emphysema)            (1 Point)                                                                          [ ] Acute myocardial infarction                         (1 Point)                  [ ] Congestive heart failure (in the previous month)  (1 Point)         [ ] Inflammatory bowel disease                            (1 Point)                  [ ] Central venous access, PICC or Port               (2 points)       (within the last month)                                                                [ ] Stroke (in the previous month)                        (5 Points)    [ ] Previous or present malignancy                       (2 points)                                                                                                                                                         HEMATOLOGY RELATED FACTORS                                                         [ ] Prior episodes of VTE                                     (3 Points)                     [ ] Positive family history for VTE                      (3 Points)                  [ ] Prothrombin 97892 A                                     (3 Points)                     [ ] Factor V Leiden                                                (3 Points)                        [ ] Lupus anticoagulants                                      (3 Points)                                                           [ ] Anticardiolipin antibodies                              (3 Points)                                                       [ ] High homocysteine in the blood                   (3 Points)                                             [ ] Other congenital or acquired thrombophilia      (3 Points)                                                [ ] Heparin induced thrombocytopenia                  (3 Points)                                        MOBILITY RELATED FACTORS  [ ] Bed rest                                                         (1 Point)  [ ] Plaster cast                                                    (2 points)  [ ] Bed bound for more than 72 hours           (2 Points)    GENDER SPECIFIC FACTORS  [ ] Pregnancy or had a baby within the last month   (1 Point)  [ ] Post-partum < 6 weeks                                   (1 Point)  [ ] Hormonal therapy  or oral contraception   (1 Point)  [ ] History of pregnancy complications              (1 point)  [ ] Unexplained or recurrent              (1 Point)    OTHER RISK FACTORS                                           (1 Point)  [ ] BMI >40, smoking, diabetes requiring insulin, chemotherapy  blood transfusions and length of surgery over 2 hours    SURGERY RELATED RISK FACTORS  [ ]  Section within the last month     (1 Point)  [ ] Minor surgery                                                  (1 Point)  [ ] Arthroscopic surgery                                       (2 Points)  [X ] Planned major surgery lasting more            (2 Points)      than 45 minutes     [ ] Elective hip or knee joint replacement       (5 points)       surgery                                                TRAUMA RELATED RISK FACTORS  [ ] Fracture of the hip, pelvis, or leg                       (5 Points)  [ ] Spinal cord injury resulting in paralysis             (5 points)       (in the previous month)    [ ] Paralysis  (less than 1 month)                             (5 Points)  [ ] Multiple Trauma within 1 month                        (5 Points)    Total Score [     3   ]    Caprini Score 0-2: Low Risk, NO VTE prophylaxis required for most patients, encourage ambulation  Caprini Score 3-6: Moderate Risk , pharmacologic VTE prophylaxis is indicated for most patients (in the absence of contraindications)  Caprini Score Greater than or =7: High risk, pharmocologic VTE prophylaxis indicated for most patients (in the absence of contraindications)    OPIOID RISK TOOL    HERBERT EACH BOX THAT APPLIES AND ADD TOTALS AT THE END    FAMILY HISTORY OF SUBSTANCE ABUSE                 FEMALE         MALE                                                Alcohol                             [  ]1 pt          [  ]3pts                                               Illegal Durgs                     [  ]2 pts        [  ]3pts                                               Rx Drugs                           [  ]4 pts        [  ]4 pts    PERSONAL HISTORY OF SUBSTANCE ABUSE                                                                                          Alcohol                             [  ]3 pts       [  ]3 pts                                               Illegal Drugs                     [  ]4 pts        [  ]4 pts                                               Rx Drugs                           [  ]5 pts        [  ]5 pts    AGE BETWEEN 16-45 YEARS                                      [  ]1 pt         [X  ]1 pt    HISTORY OF PREADOLESCENT   SEXUAL ABUSE                                                             [  ]3 pts        [  ]0pts    PSYCHOLOGICAL DISEASE                     ADD, OCD, Bipolar, Schizophrenia        [  ]2 pts         [  ]2 pts                      Depression                                               [  ]1 pt           [  ]1 pt           SCORING TOTAL   (add numbers and type here)              (*1**)                                     A score of 3 or lower indicated LOW risk for future opioid abuse  A score of 4 to 7 indicated moderate risk for future opioid abuse  A score of 8 or higher indicates a high risk for opioid abuse

## 2023-01-20 NOTE — H&P PST ADULT - NEUROLOGICAL
normal/sensation intact/responds to verbal commands/cranial nerves intact/no spontaneous movement details…

## 2023-01-20 NOTE — H&P PST ADULT - PROBLEM SELECTOR PLAN 3
ORT score 1 patient low risk for opioid abuse Thyroid panel and EKG performed  Continue medications as instructed.

## 2023-01-20 NOTE — H&P PST ADULT - GASTROINTESTINAL
details… + umbilical hernia noted above umbilicus, approximately 1 CM, no tenderness upon examination, no erythema/edema, skin intact, surgical site clear/normal/soft/nontender/nondistended/normal active bowel sounds/no guarding/no rigidity

## 2023-01-20 NOTE — H&P PST ADULT - HISTORY OF PRESENT ILLNESS
Pt is a 36 y/o male, PMH hypothyroidism, b/l inguinal hernia s/p repair, presents to [facility], scheduled for Open Umbilical Hernia Repair on 2.7.23 with Dr. Ellsworth.      Pt is a 36 y/o male, PMH hypothyroidism, b/l inguinal hernia s/p repair as child, presents to PST, scheduled for Open Umbilical Hernia Repair Dr. Ellsworth.  Anthony explains originally noticing umbilical hernia without inciting incident several years ago, denies pain/discomfort to site, however elective for intervention as progressively becoming larger, f/u with Dr. Ellsworth 07/2022 for consultation, was scheduled for hernia repair however subsequently tested positive for Covid 19.  Pt reports mild s/s covid / recovered at home, procedure rescheduled for 2.7.23.  Pt reports feeling generally well, denies abdominal pain, tenderness to hernia site, n/v, change in bowel pattern, hematochezia, denies recent fever/cough, s/s infection or antibiotic use.

## 2023-01-20 NOTE — H&P PST ADULT - NEGATIVE ENMT SYMPTOMS
no hearing difficulty/no ear pain/no vertigo/no nasal congestion/no nasal discharge/no nasal obstruction/no post-nasal discharge

## 2023-01-20 NOTE — H&P PST ADULT - NEGATIVE OPHTHALMOLOGIC SYMPTOMS
wears glasses, no contacts/no diplopia/no photophobia/no lacrimation L/no lacrimation R/no blurred vision L/no blurred vision R/no discharge L/no discharge R

## 2023-01-20 NOTE — H&P PST ADULT - NSICDXPASTMEDICALHX_GEN_ALL_CORE_FT
PAST MEDICAL HISTORY:  COVID-19 vaccine series completed     Hypothyroidism     Obstructive sleep apnea CPAP    Umbilical hernia without obstruction or gangrene      PAST MEDICAL HISTORY:  COVID-19 vaccine series completed     History of bilateral inguinal hernias     Hypothyroidism     Obstructive sleep apnea CPAP    Umbilical hernia without obstruction or gangrene

## 2023-01-20 NOTE — H&P PST ADULT - CARDIOVASCULAR
normal/regular rate and rhythm/S1 S2 present/no gallops/no rub/no murmur/no pedal edema/peripheral edema details…

## 2023-01-21 LAB
MRSA PCR RESULT.: SIGNIFICANT CHANGE UP
S AUREUS DNA NOSE QL NAA+PROBE: SIGNIFICANT CHANGE UP

## 2023-02-06 ENCOUNTER — TRANSCRIPTION ENCOUNTER (OUTPATIENT)
Age: 38
End: 2023-02-06

## 2023-02-06 LAB — SARS-COV-2 N GENE NPH QL NAA+PROBE: NOT DETECTED

## 2023-02-06 NOTE — ASU PATIENT PROFILE, ADULT - NSICDXPASTMEDICALHX_GEN_ALL_CORE_FT
PAST MEDICAL HISTORY:  COVID-19 vaccine series completed     History of bilateral inguinal hernias     Hypothyroidism     Obstructive sleep apnea CPAP    Umbilical hernia without obstruction or gangrene

## 2023-02-06 NOTE — ASU PATIENT PROFILE, ADULT - WAS YOUR LAST COVID-19 VACCINE GREATER THAN OR EQUAL TO TWO MONTHS AGO?
[FreeTextEntry1] : This patient brought studies with her from 7/2/19 showing no blood on urinalysis and a negative microscopic study as well. There was no pyuria and culture was negative. Cytology was negative for cancer but showed a few RBC's.  Cytology should not be done to assess for presence or absence of microscopic hematuria.  Being that this is the first time she was told she had urine and she has been with you for quite some time. Therefore, we will send out urine for repeat study to determine what if anything needs to be done.\par \par There is luckily longevity in her family and no FH of  malignancies, anomalies, ESRD or stones.  There are also no c/o excessive bleeding issues and FH is negative for SCD, SCT or any other blood abnormalities.  Yes

## 2023-02-07 ENCOUNTER — APPOINTMENT (OUTPATIENT)
Dept: SURGERY | Facility: HOSPITAL | Age: 38
End: 2023-02-07

## 2023-02-07 ENCOUNTER — OUTPATIENT (OUTPATIENT)
Dept: INPATIENT UNIT | Facility: HOSPITAL | Age: 38
LOS: 1 days | End: 2023-02-07
Payer: COMMERCIAL

## 2023-02-07 ENCOUNTER — TRANSCRIPTION ENCOUNTER (OUTPATIENT)
Age: 38
End: 2023-02-07

## 2023-02-07 VITALS
DIASTOLIC BLOOD PRESSURE: 61 MMHG | SYSTOLIC BLOOD PRESSURE: 133 MMHG | TEMPERATURE: 97 F | OXYGEN SATURATION: 100 % | RESPIRATION RATE: 16 BRPM | HEART RATE: 59 BPM

## 2023-02-07 VITALS
TEMPERATURE: 98 F | OXYGEN SATURATION: 100 % | HEART RATE: 53 BPM | SYSTOLIC BLOOD PRESSURE: 128 MMHG | RESPIRATION RATE: 16 BRPM | WEIGHT: 217.6 LBS | HEIGHT: 77 IN | DIASTOLIC BLOOD PRESSURE: 68 MMHG

## 2023-02-07 DIAGNOSIS — Z98.890 OTHER SPECIFIED POSTPROCEDURAL STATES: Chronic | ICD-10-CM

## 2023-02-07 DIAGNOSIS — K42.9 UMBILICAL HERNIA WITHOUT OBSTRUCTION OR GANGRENE: ICD-10-CM

## 2023-02-07 PROCEDURE — 49591 RPR AA HRN 1ST < 3 CM RDC: CPT

## 2023-02-07 PROCEDURE — 49591 RPR AA HRN 1ST < 3 CM RDC: CPT | Mod: AS

## 2023-02-07 RX ORDER — FENTANYL CITRATE 50 UG/ML
25 INJECTION INTRAVENOUS
Refills: 0 | Status: DISCONTINUED | OUTPATIENT
Start: 2023-02-07 | End: 2023-02-07

## 2023-02-07 RX ORDER — ONDANSETRON 8 MG/1
4 TABLET, FILM COATED ORAL ONCE
Refills: 0 | Status: DISCONTINUED | OUTPATIENT
Start: 2023-02-07 | End: 2023-02-07

## 2023-02-07 RX ORDER — ACETAMINOPHEN 500 MG
975 TABLET ORAL ONCE
Refills: 0 | Status: COMPLETED | OUTPATIENT
Start: 2023-02-07 | End: 2023-02-07

## 2023-02-07 RX ORDER — SODIUM CHLORIDE 9 MG/ML
1000 INJECTION, SOLUTION INTRAVENOUS
Refills: 0 | Status: DISCONTINUED | OUTPATIENT
Start: 2023-02-07 | End: 2023-02-07

## 2023-02-07 RX ORDER — CEFAZOLIN SODIUM 1 G
2000 VIAL (EA) INJECTION ONCE
Refills: 0 | Status: DISCONTINUED | OUTPATIENT
Start: 2023-02-07 | End: 2023-02-07

## 2023-02-07 RX ORDER — KETOROLAC TROMETHAMINE 30 MG/ML
30 SYRINGE (ML) INJECTION ONCE
Refills: 0 | Status: DISCONTINUED | OUTPATIENT
Start: 2023-02-07 | End: 2023-02-07

## 2023-02-07 RX ORDER — CELECOXIB 200 MG/1
400 CAPSULE ORAL ONCE
Refills: 0 | Status: COMPLETED | OUTPATIENT
Start: 2023-02-07 | End: 2023-02-07

## 2023-02-07 RX ORDER — BUPIVACAINE 13.3 MG/ML
20 INJECTION, SUSPENSION, LIPOSOMAL INFILTRATION ONCE
Refills: 0 | Status: DISCONTINUED | OUTPATIENT
Start: 2023-02-07 | End: 2023-02-07

## 2023-02-07 RX ORDER — LEVOTHYROXINE SODIUM 125 MCG
200 TABLET ORAL DAILY
Refills: 0 | Status: DISCONTINUED | OUTPATIENT
Start: 2023-02-07 | End: 2023-02-07

## 2023-02-07 RX ORDER — HYDROMORPHONE HYDROCHLORIDE 2 MG/ML
1 INJECTION INTRAMUSCULAR; INTRAVENOUS; SUBCUTANEOUS
Refills: 0 | Status: DISCONTINUED | OUTPATIENT
Start: 2023-02-07 | End: 2023-02-07

## 2023-02-07 RX ORDER — SODIUM CHLORIDE 9 MG/ML
3 INJECTION INTRAMUSCULAR; INTRAVENOUS; SUBCUTANEOUS ONCE
Refills: 0 | Status: DISCONTINUED | OUTPATIENT
Start: 2023-02-07 | End: 2023-02-07

## 2023-02-07 RX ADMIN — Medication 975 MILLIGRAM(S): at 07:38

## 2023-02-07 RX ADMIN — CELECOXIB 400 MILLIGRAM(S): 200 CAPSULE ORAL at 07:39

## 2023-02-07 NOTE — ASU PREOP CHECKLIST - PATIENT'S PERSONAL PROPERTY GIVEN TO
Subjective:       Patient ID: Juni Devries Jr. is a 41 y.o. male.    Chief Complaint: Establish Care; Depression; and Insomnia    Sleeping and mood problems    HPI: 42 y/o presents to establish PCP. Would like to discuss difficulty iwht sleep and irritability. Has had difficulty maintaining sleep since adolscence. Will sleep one or two hours at a time and then awaken. Never felt it was a problem when he was single but wife states he is more irritablity. He does endorese ability to go for three or four days without significant sleep and does not feel tired. Works as . Completed high school. No history of psychiatric medications or hospitalizations denies SI/HI denies AH/VH. Thinks mother may have had depression not sure if she took any medicaitons. Formerly heavey drinker now only on birthday or special occasion. Does smoke 1/2 pack per day (cut down from 2 packs per day two years ago) denies illict or IVDU.       Review of Systems   Constitutional: Negative for activity change, fever and unexpected weight change.   HENT: Negative for congestion, rhinorrhea, sore throat and trouble swallowing.    Eyes: Negative for photophobia and redness.   Respiratory: Negative for cough, chest tightness, shortness of breath and wheezing.    Cardiovascular: Negative for chest pain, palpitations and leg swelling.   Gastrointestinal: Negative for abdominal pain, blood in stool, constipation, diarrhea, nausea and vomiting.   Endocrine: Negative for cold intolerance, heat intolerance and polyuria.   Genitourinary: Negative for decreased urine volume, difficulty urinating, dysuria and urgency.   Musculoskeletal: Negative for arthralgias and back pain.   Skin: Negative for rash.   Neurological: Negative for dizziness, syncope, weakness and headaches.   Psychiatric/Behavioral: Positive for agitation and sleep disturbance. Negative for dysphoric mood, hallucinations and suicidal ideas. The patient is nervous/anxious.   "      Objective:     Vitals:    10/31/17 1034   BP: 124/76   Pulse: 102   Resp: 12   Temp: 98.1 °F (36.7 °C)   TempSrc: Oral   SpO2: 97%   Weight: 77.1 kg (169 lb 15.6 oz)   Height: 5' 5.5" (1.664 m)          Physical Exam   Constitutional: He is oriented to person, place, and time. He appears well-developed and well-nourished.   HENT:   Head: Normocephalic and atraumatic.   Eyes: Conjunctivae are normal. Pupils are equal, round, and reactive to light. No scleral icterus.   Neck: Normal range of motion. Neck supple. No thyromegaly present.   Cardiovascular: Normal rate and regular rhythm.  Exam reveals no gallop and no friction rub.    No murmur heard.  Pulmonary/Chest: Effort normal and breath sounds normal. He has no wheezes. He has no rales.   Abdominal: Soft. Bowel sounds are normal. There is no tenderness. There is no rebound and no guarding.   Musculoskeletal: Normal range of motion. He exhibits no edema or tenderness.   Neurological: He is alert and oriented to person, place, and time. No cranial nerve deficit.   Normal gait atremulous   Skin: Skin is warm and dry.   Psychiatric: He has a normal mood and affect.   Mildly dissheveled appearance not responding to internal stimuli some psychomotor agitation noted (knee hopping) normal speech linear thought process normal content       Assessment:       1. Bipolar disorder, current episode mixed, moderate    2. Insomnia due to other mental disorder    3. Screening for hyperlipidemia        Plan:    1/2. With aspects of hypomania/esme serquel to targety improved sleep and mood stablization ssri for irritability monitor for serotonin syndrome discussed risk of proving manic period. Side effects of sedation with seroquel anorgasmia with ssri. Screen for endocrine cause with tsh, renal function and blood count    3. Fasting lipid panel    F;u one month to monitor symptoms      " on unit

## 2023-02-07 NOTE — ASU DISCHARGE PLAN (ADULT/PEDIATRIC) - CARE PROVIDER_API CALL
Filippo Ellsworth)  Surgery  [address] [place] [address], [address] [place] [address]  Phone: [phone]  Fax: [phone]  Follow Up Time:

## 2023-02-07 NOTE — BRIEF OPERATIVE NOTE - OPERATION/FINDINGS
supraumbilical incision made and stalk elevated. approx 1.5cm defect noted and repair primarily with 0 prolene. Umbilical stalk reattached and deep dermals placed for approximation. skin closed with monocryl and dermabond

## 2023-02-28 DIAGNOSIS — K43.9 VENTRAL HERNIA W/OUT OBSTRUCTION OR GANGRENE: ICD-10-CM

## 2023-02-28 PROBLEM — Z87.19 PERSONAL HISTORY OF OTHER DISEASES OF THE DIGESTIVE SYSTEM: Chronic | Status: ACTIVE | Noted: 2023-01-20

## 2023-03-29 ENCOUNTER — APPOINTMENT (OUTPATIENT)
Dept: SURGERY | Facility: HOSPITAL | Age: 38
End: 2023-03-29

## 2023-10-03 ENCOUNTER — OFFICE (OUTPATIENT)
Dept: URBAN - METROPOLITAN AREA CLINIC 1 | Facility: CLINIC | Age: 38
Setting detail: OPHTHALMOLOGY
End: 2023-10-03
Payer: COMMERCIAL

## 2023-10-03 DIAGNOSIS — H16.223: ICD-10-CM

## 2023-10-03 DIAGNOSIS — H52.13: ICD-10-CM

## 2023-10-03 PROBLEM — H52.7 REFRACTIVE ERROR: Status: ACTIVE | Noted: 2023-10-03

## 2023-10-03 PROBLEM — H40.013 GLAUCOMA SUSPECT, LOW RISK 1-2 FACTORS; BOTH EYES: Status: ACTIVE | Noted: 2023-10-03

## 2023-10-03 PROCEDURE — 92004 COMPRE OPH EXAM NEW PT 1/>: CPT

## 2023-10-03 PROCEDURE — 92015 DETERMINE REFRACTIVE STATE: CPT

## 2023-10-03 ASSESSMENT — KERATOMETRY
OS_K1POWER_DIOPTERS: ERROR
OD_K1POWER_DIOPTERS: 41.50
OD_K2POWER_DIOPTERS: 43.00
OD_AXISANGLE_DEGREES: 086

## 2023-10-03 ASSESSMENT — REFRACTION_AUTOREFRACTION
OS_SPHERE: -2.00
OD_AXIS: 007
OD_SPHERE: -2.50
OS_AXIS: 09
OD_CYLINDER: -1.25
OS_CYLINDER: -1.50

## 2023-10-03 ASSESSMENT — REFRACTION_CURRENTRX
OS_CYLINDER: -0.75
OS_AXIS: 006
OS_VPRISM_DIRECTION: SV
OD_CYLINDER: -0.75
OD_VPRISM_DIRECTION: SV
OD_AXIS: 008
OD_SPHERE: -3.25
OD_OVR_VA: 20/
OS_OVR_VA: 20/
OS_SPHERE: -3.00

## 2023-10-03 ASSESSMENT — SPHEQUIV_DERIVED
OD_SPHEQUIV: -3.5
OD_SPHEQUIV: -3.125
OS_SPHEQUIV: -3.25
OS_SPHEQUIV: -2.75

## 2023-10-03 ASSESSMENT — REFRACTION_MANIFEST
OS_CYLINDER: -1.00
OD_SPHERE: -3.00
OS_VA1: 20/20
OS_SPHERE: -2.75
OD_AXIS: 5
OD_CYLINDER: -1.00
OD_VA1: 20/20
OS_AXIS: 5

## 2023-10-03 ASSESSMENT — TONOMETRY
OD_IOP_MMHG: 19
OS_IOP_MMHG: 21

## 2023-10-03 ASSESSMENT — AXIALLENGTH_DERIVED
OD_AL: 25.5673
OD_AL: 25.3968

## 2023-10-03 ASSESSMENT — VISUAL ACUITY
OD_BCVA: 20/25-1
OS_BCVA: 20/25

## 2023-10-03 ASSESSMENT — SUPERFICIAL PUNCTATE KERATITIS (SPK)
OS_SPK: T
OD_SPK: T

## 2023-10-03 ASSESSMENT — CONFRONTATIONAL VISUAL FIELD TEST (CVF)
OS_FINDINGS: FULL
OD_FINDINGS: FULL

## 2023-11-21 NOTE — ED PROVIDER NOTE - PRINCIPAL DIAGNOSIS
Severe mitral regurgitation LAURA (acute kidney injury) Severe mitral regurgitation Severe mitral regurgitation Acute HFrEF (heart failure with reduced ejection fraction) History of ventricular tachycardia Severe mitral regurgitation Acute HFrEF (heart failure with reduced ejection fraction) Acute hypoxic respiratory failure NSTEMI (non-ST elevation myocardial infarction) Severe mitral regurgitation LV (left ventricular) mural thrombus Acute HFrEF (heart failure with reduced ejection fraction) History of ventricular tachycardia LV (left ventricular) mural thrombus Vertebral artery stenosis History of ventricular tachycardia LAURA (acute kidney injury) LV (left ventricular) mural thrombus LV (left ventricular) mural thrombus Severe mitral regurgitation LV (left ventricular) mural thrombus Severe mitral regurgitation Severe mitral regurgitation History of ventricular tachycardia History of ventricular tachycardia Severe mitral regurgitation History of ventricular tachycardia LAURA (acute kidney injury) LV (left ventricular) mural thrombus LV (left ventricular) mural thrombus Severe mitral regurgitation LV (left ventricular) mural thrombus NSTEMI (non-ST elevation myocardial infarction) History of ventricular tachycardia LV (left ventricular) mural thrombus Acute HFrEF (heart failure with reduced ejection fraction) History of ventricular tachycardia Severe mitral regurgitation History of ventricular tachycardia LAURA (acute kidney injury) LV (left ventricular) mural thrombus LV (left ventricular) mural thrombus Acute HFrEF (heart failure with reduced ejection fraction) LAURA (acute kidney injury) LAURA (acute kidney injury) Vertebral artery stenosis LV (left ventricular) mural thrombus Acute HFrEF (heart failure with reduced ejection fraction) LAURA (acute kidney injury) Acute HFrEF (heart failure with reduced ejection fraction) Acute hypoxic respiratory failure Severe mitral regurgitation Acute hypoxic respiratory failure Viral illness Acute hypoxic respiratory failure Acute HFrEF (heart failure with reduced ejection fraction) LV (left ventricular) mural thrombus

## 2023-12-28 NOTE — H&P PST ADULT - PROBLEM/PLAN-4
I can prescribe her paxlovid but she should not take her trazodone, statin or ambien while on the paxlovid. If still agreeable, I will send prescription.   
Patient states sore throat, body aches, congestion and cough starting on December 25.  States she tested positive for covid on December 26.  Patient is requesting a prescription for paxlovid to be sent to [facility].  Patient presented to [place] urgent care stating that she was instructed to be evaluated so that the paxlovid could be prescribed.  Spoke with Halima Allred PA-C and she states she will prescribe medication for the patient.  Patient's vitals are stable at time of triage and patient does not wish to be evaluated if the prescription can be sent through to [facility] for her.  Patient is aware that if she develops any worsening symptoms, difficulty breathing or chest pain that she needs to be evaluated in the ER.    
Script sent  
Spoke to patient.  Informed of prescription can be sent per Halima. Informed to not take the medication listed while on paxlovid.  Patient verbalized understanding.   
DISPLAY PLAN FREE TEXT

## 2024-04-16 ENCOUNTER — OUTPATIENT (OUTPATIENT)
Dept: OUTPATIENT SERVICES | Facility: HOSPITAL | Age: 39
LOS: 1 days | End: 2024-04-16
Payer: COMMERCIAL

## 2024-04-16 DIAGNOSIS — Z98.890 OTHER SPECIFIED POSTPROCEDURAL STATES: Chronic | ICD-10-CM

## 2024-04-16 DIAGNOSIS — E03.9 HYPOTHYROIDISM, UNSPECIFIED: ICD-10-CM

## 2024-04-16 PROCEDURE — 76536 US EXAM OF HEAD AND NECK: CPT | Mod: 26

## 2024-04-16 PROCEDURE — 76536 US EXAM OF HEAD AND NECK: CPT

## 2024-05-07 ENCOUNTER — OFFICE (OUTPATIENT)
Dept: URBAN - METROPOLITAN AREA CLINIC 103 | Facility: CLINIC | Age: 39
Setting detail: OPHTHALMOLOGY
End: 2024-05-07
Payer: COMMERCIAL

## 2024-05-07 DIAGNOSIS — H52.13: ICD-10-CM

## 2024-05-07 PROCEDURE — SCREF LASIK EVAL: Performed by: OPHTHALMOLOGY

## 2024-05-14 ENCOUNTER — APPOINTMENT (OUTPATIENT)
Dept: OTOLARYNGOLOGY | Facility: CLINIC | Age: 39
End: 2024-05-14
Payer: COMMERCIAL

## 2024-05-14 VITALS
HEIGHT: 77 IN | BODY MASS INDEX: 25.39 KG/M2 | HEART RATE: 69 BPM | SYSTOLIC BLOOD PRESSURE: 115 MMHG | WEIGHT: 215 LBS | DIASTOLIC BLOOD PRESSURE: 67 MMHG

## 2024-05-14 DIAGNOSIS — F12.90 CANNABIS USE, UNSPECIFIED, UNCOMPLICATED: ICD-10-CM

## 2024-05-14 DIAGNOSIS — Z86.39 PERSONAL HISTORY OF OTHER ENDOCRINE, NUTRITIONAL AND METABOLIC DISEASE: ICD-10-CM

## 2024-05-14 DIAGNOSIS — Z83.438 FAMILY HISTORY OF OTHER DISORDER OF LIPOPROTEIN METABOLISM AND OTHER LIPIDEMIA: ICD-10-CM

## 2024-05-14 DIAGNOSIS — J34.2 DEVIATED NASAL SEPTUM: ICD-10-CM

## 2024-05-14 DIAGNOSIS — G47.33 OBSTRUCTIVE SLEEP APNEA (ADULT) (PEDIATRIC): ICD-10-CM

## 2024-05-14 DIAGNOSIS — Z82.49 FAMILY HISTORY OF ISCHEMIC HEART DISEASE AND OTHER DISEASES OF THE CIRCULATORY SYSTEM: ICD-10-CM

## 2024-05-14 DIAGNOSIS — Z78.9 OTHER SPECIFIED HEALTH STATUS: ICD-10-CM

## 2024-05-14 DIAGNOSIS — Z86.69 PERSONAL HISTORY OF OTHER DISEASES OF THE NERVOUS SYSTEM AND SENSE ORGANS: ICD-10-CM

## 2024-05-14 DIAGNOSIS — Z80.3 FAMILY HISTORY OF MALIGNANT NEOPLASM OF BREAST: ICD-10-CM

## 2024-05-14 PROCEDURE — 99204 OFFICE O/P NEW MOD 45 MIN: CPT | Mod: 25

## 2024-05-14 PROCEDURE — 31231 NASAL ENDOSCOPY DX: CPT

## 2024-05-14 RX ORDER — CETIRIZINE HCL 10 MG
TABLET ORAL
Refills: 0 | Status: ACTIVE | COMMUNITY

## 2024-05-14 RX ORDER — LEVOTHYROXINE SODIUM 137 UG/1
TABLET ORAL
Refills: 0 | Status: ACTIVE | COMMUNITY

## 2024-05-14 NOTE — PROCEDURE
[FreeTextEntry6] : Procedure: Bilateral nasal endoscopy (CPT 80947)   Indication: Anterior rhinoscopy was inadequate to evaluate pathology.  Left: Septum midline Moderate inferior turbinate hypertrophy  Middle meatus clear, without masses, polyps, or pus Sphenoethmoidal recess clear, without masses, polyps, or pus  Right:  Septum broadly right Moderate inferior turbinate hypertrophy Middle meatus clear, without masses, polyps, or pus  Sphenoethmoidal recess clear, without masses, polyps, or pus  [de-identified] : NPL: Nasopharynx clear without masses Base of tongue without masses or lesions Vallecula clear Pyriforms clear Epiglottis crisp TVFs mobile bilaterally  Glottic airway patent Muscular pharynx and prominent BOT

## 2024-05-14 NOTE — PLAN
[TextEntry] : We extensively reviewed his exam and symptoms. Given that he has DNS and has failed medical therapy, we reviewed the role of septoplasty and turbinate reduction. At that time, we can certainly perform DISE given his CPAP intolerance.   I recommend he discuss the role of an oral appliance with his PCP. Given his severity and his exam, although not DISE, this may benefit him.   We have discussed at length the treatment options which include but are not limited to the following: observation, further medical therapy, and/or surgical intervention. Based on the face that observation and medical therapy has not resulted in resolution of symptoms and the patient has persistent inflammation and obstruction on both nasal endoscopy and and symptomatically, the patient has decided to proceed with elective major surgery.   The procedure itself was discussed at length. The risks, benefits, and alternatives were explained in detail which include but is not limited to: anesthesia, pain, loss of smell/taste, bleeding, infection, need for nasal packing, double vision, numbness of teeth and/or face, septal perforation, need for revision surgery, and unexpected risks. The patient understands these risks and is willing to proceed with surgery. All questions were answered.  PCP clearance.

## 2024-05-14 NOTE — HISTORY OF PRESENT ILLNESS
[de-identified] : 38M with nasal congestion R>L and recent mild epistaxis from the right. Patient notes that when he pressured along the region of the ascending process of the maxilla on the right he can feel fluid and pressure shifts. He has ANTOINE, reported AHI 16 from 2 years ago. He does not tolerate CPAP and has not worn in several months. He uses flonase with littl ebenefit.

## 2024-06-10 ENCOUNTER — OTHER LOCATION (OUTPATIENT)
Dept: URBAN - METROPOLITAN AREA LASIK CENTER 6 | Facility: LASIK CENTER | Age: 39
Setting detail: OPHTHALMOLOGY
End: 2024-06-10

## 2024-06-10 DIAGNOSIS — H52.13: ICD-10-CM

## 2024-06-10 PROCEDURE — 65760 KERATOMILEUSIS: CPT | Mod: RT,LT | Performed by: OPHTHALMOLOGY

## 2024-06-11 ENCOUNTER — OFFICE (OUTPATIENT)
Dept: URBAN - METROPOLITAN AREA CLINIC 103 | Facility: CLINIC | Age: 39
Setting detail: OPHTHALMOLOGY
End: 2024-06-11
Payer: COMMERCIAL

## 2024-06-11 ENCOUNTER — RX ONLY (RX ONLY)
Age: 39
End: 2024-06-11

## 2024-06-11 DIAGNOSIS — H52.13: ICD-10-CM

## 2024-06-11 PROCEDURE — 99024 POSTOP FOLLOW-UP VISIT: CPT | Performed by: OPHTHALMOLOGY

## 2024-06-11 ASSESSMENT — CONFRONTATIONAL VISUAL FIELD TEST (CVF)
OS_FINDINGS: FULL
OD_FINDINGS: FULL

## 2024-06-18 ENCOUNTER — OFFICE (OUTPATIENT)
Dept: URBAN - METROPOLITAN AREA CLINIC 103 | Facility: CLINIC | Age: 39
Setting detail: OPHTHALMOLOGY
End: 2024-06-18
Payer: COMMERCIAL

## 2024-06-18 DIAGNOSIS — H52.13: ICD-10-CM

## 2024-06-18 PROCEDURE — 99024 POSTOP FOLLOW-UP VISIT: CPT | Performed by: OPHTHALMOLOGY

## 2024-06-18 ASSESSMENT — CONFRONTATIONAL VISUAL FIELD TEST (CVF)
OD_FINDINGS: FULL
OS_FINDINGS: FULL

## 2024-08-06 ENCOUNTER — OFFICE (OUTPATIENT)
Dept: URBAN - METROPOLITAN AREA CLINIC 103 | Facility: CLINIC | Age: 39
Setting detail: OPHTHALMOLOGY
End: 2024-08-06
Payer: COMMERCIAL

## 2024-08-06 DIAGNOSIS — H52.13: ICD-10-CM

## 2024-08-06 PROCEDURE — 99024 POSTOP FOLLOW-UP VISIT: CPT | Performed by: OPHTHALMOLOGY

## 2024-10-16 ENCOUNTER — OUTPATIENT (OUTPATIENT)
Dept: OUTPATIENT SERVICES | Facility: HOSPITAL | Age: 39
LOS: 1 days | End: 2024-10-16
Payer: COMMERCIAL

## 2024-10-16 VITALS
OXYGEN SATURATION: 97 % | HEART RATE: 65 BPM | RESPIRATION RATE: 18 BRPM | WEIGHT: 211.64 LBS | HEIGHT: 77 IN | DIASTOLIC BLOOD PRESSURE: 68 MMHG | TEMPERATURE: 97 F | SYSTOLIC BLOOD PRESSURE: 108 MMHG

## 2024-10-16 DIAGNOSIS — Z29.9 ENCOUNTER FOR PROPHYLACTIC MEASURES, UNSPECIFIED: ICD-10-CM

## 2024-10-16 DIAGNOSIS — Z01.818 ENCOUNTER FOR OTHER PREPROCEDURAL EXAMINATION: ICD-10-CM

## 2024-10-16 DIAGNOSIS — E03.9 HYPOTHYROIDISM, UNSPECIFIED: ICD-10-CM

## 2024-10-16 DIAGNOSIS — Z98.890 OTHER SPECIFIED POSTPROCEDURAL STATES: Chronic | ICD-10-CM

## 2024-10-16 DIAGNOSIS — Z13.89 ENCOUNTER FOR SCREENING FOR OTHER DISORDER: ICD-10-CM

## 2024-10-16 DIAGNOSIS — K42.9 UMBILICAL HERNIA WITHOUT OBSTRUCTION OR GANGRENE: Chronic | ICD-10-CM

## 2024-10-16 LAB
A1C WITH ESTIMATED AVERAGE GLUCOSE RESULT: 5.4 % — SIGNIFICANT CHANGE UP (ref 4–5.6)
ANION GAP SERPL CALC-SCNC: 7 MMOL/L — SIGNIFICANT CHANGE UP (ref 5–17)
APTT BLD: 32 SEC — SIGNIFICANT CHANGE UP (ref 24.5–35.6)
BASOPHILS # BLD AUTO: 0.02 K/UL — SIGNIFICANT CHANGE UP (ref 0–0.2)
BASOPHILS NFR BLD AUTO: 0.4 % — SIGNIFICANT CHANGE UP (ref 0–2)
BLD GP AB SCN SERPL QL: SIGNIFICANT CHANGE UP
BUN SERPL-MCNC: 21.1 MG/DL — HIGH (ref 8–20)
CALCIUM SERPL-MCNC: 9.4 MG/DL — SIGNIFICANT CHANGE UP (ref 8.4–10.5)
CHLORIDE SERPL-SCNC: 103 MMOL/L — SIGNIFICANT CHANGE UP (ref 96–108)
CO2 SERPL-SCNC: 30 MMOL/L — HIGH (ref 22–29)
CREAT SERPL-MCNC: 1.01 MG/DL — SIGNIFICANT CHANGE UP (ref 0.5–1.3)
EGFR: 98 ML/MIN/1.73M2 — SIGNIFICANT CHANGE UP
EOSINOPHIL # BLD AUTO: 0.12 K/UL — SIGNIFICANT CHANGE UP (ref 0–0.5)
EOSINOPHIL NFR BLD AUTO: 2.6 % — SIGNIFICANT CHANGE UP (ref 0–6)
ESTIMATED AVERAGE GLUCOSE: 108 MG/DL — SIGNIFICANT CHANGE UP (ref 68–114)
GLUCOSE SERPL-MCNC: 71 MG/DL — SIGNIFICANT CHANGE UP (ref 70–99)
HCT VFR BLD CALC: 46.1 % — SIGNIFICANT CHANGE UP (ref 39–50)
HGB BLD-MCNC: 15.6 G/DL — SIGNIFICANT CHANGE UP (ref 13–17)
IMM GRANULOCYTES NFR BLD AUTO: 0.2 % — SIGNIFICANT CHANGE UP (ref 0–0.9)
INR BLD: 1.16 RATIO — SIGNIFICANT CHANGE UP (ref 0.85–1.16)
LYMPHOCYTES # BLD AUTO: 1.41 K/UL — SIGNIFICANT CHANGE UP (ref 1–3.3)
LYMPHOCYTES # BLD AUTO: 31 % — SIGNIFICANT CHANGE UP (ref 13–44)
MCHC RBC-ENTMCNC: 29.9 PG — SIGNIFICANT CHANGE UP (ref 27–34)
MCHC RBC-ENTMCNC: 33.8 GM/DL — SIGNIFICANT CHANGE UP (ref 32–36)
MCV RBC AUTO: 88.5 FL — SIGNIFICANT CHANGE UP (ref 80–100)
MONOCYTES # BLD AUTO: 0.32 K/UL — SIGNIFICANT CHANGE UP (ref 0–0.9)
MONOCYTES NFR BLD AUTO: 7 % — SIGNIFICANT CHANGE UP (ref 2–14)
NEUTROPHILS # BLD AUTO: 2.67 K/UL — SIGNIFICANT CHANGE UP (ref 1.8–7.4)
NEUTROPHILS NFR BLD AUTO: 58.8 % — SIGNIFICANT CHANGE UP (ref 43–77)
PLATELET # BLD AUTO: 196 K/UL — SIGNIFICANT CHANGE UP (ref 150–400)
POTASSIUM SERPL-MCNC: 4.3 MMOL/L — SIGNIFICANT CHANGE UP (ref 3.5–5.3)
POTASSIUM SERPL-SCNC: 4.3 MMOL/L — SIGNIFICANT CHANGE UP (ref 3.5–5.3)
PROTHROM AB SERPL-ACNC: 13.1 SEC — SIGNIFICANT CHANGE UP (ref 9.9–13.4)
RBC # BLD: 5.21 M/UL — SIGNIFICANT CHANGE UP (ref 4.2–5.8)
RBC # FLD: 11.9 % — SIGNIFICANT CHANGE UP (ref 10.3–14.5)
SODIUM SERPL-SCNC: 140 MMOL/L — SIGNIFICANT CHANGE UP (ref 135–145)
WBC # BLD: 4.55 K/UL — SIGNIFICANT CHANGE UP (ref 3.8–10.5)
WBC # FLD AUTO: 4.55 K/UL — SIGNIFICANT CHANGE UP (ref 3.8–10.5)

## 2024-10-16 PROCEDURE — G0463: CPT

## 2024-10-16 PROCEDURE — 85730 THROMBOPLASTIN TIME PARTIAL: CPT

## 2024-10-16 PROCEDURE — 93010 ELECTROCARDIOGRAM REPORT: CPT

## 2024-10-16 PROCEDURE — 36415 COLL VENOUS BLD VENIPUNCTURE: CPT

## 2024-10-16 PROCEDURE — 93005 ELECTROCARDIOGRAM TRACING: CPT

## 2024-10-16 PROCEDURE — 86900 BLOOD TYPING SEROLOGIC ABO: CPT

## 2024-10-16 PROCEDURE — 85025 COMPLETE CBC W/AUTO DIFF WBC: CPT

## 2024-10-16 PROCEDURE — 80048 BASIC METABOLIC PNL TOTAL CA: CPT

## 2024-10-16 PROCEDURE — 86850 RBC ANTIBODY SCREEN: CPT

## 2024-10-16 PROCEDURE — 85610 PROTHROMBIN TIME: CPT

## 2024-10-16 PROCEDURE — 86901 BLOOD TYPING SEROLOGIC RH(D): CPT

## 2024-10-16 PROCEDURE — 83036 HEMOGLOBIN GLYCOSYLATED A1C: CPT

## 2024-10-16 NOTE — H&P PST ADULT - PROBLEM SELECTOR PLAN 3
Caprini - 2 mild risk   Surgical team please assess/recommend mechanical/pharmacological measures for VTE prophylaxis

## 2024-10-16 NOTE — H&P PST ADULT - PROBLEM SELECTOR PLAN 1
38M with PMH of hypothyroidism now with deviated nasal septum scheduled for drug induced sleep endoscopy, septoplasty, turbinate reduction  on 11/4/2024.       -Medical evaluation pending per Dr. Rosales   - NPO after midnight the night before surgery except for meds   - -Educated on NSAIDS, multivitamins and herbals that increase the risk of bleeding and need to be stopped 5 days before procedure  -Educated on infection prevention  -Tylenol can be taken if needed for pain  -Will continue all other medications as prescribed  -Verbalized understanding of all instructions.

## 2024-10-16 NOTE — H&P PST ADULT - HISTORY OF PRESENT ILLNESS
38M with nasal congestion R>L and recent mild epistaxis from the right. Patient notes that when he pressured along the region of the ascending process of the maxilla on the right he can feel fluid and pressure shifts. He has ANTOINE, reported AHI 16 from 2 years ago. He does not tolerate CPAP and has not worn in several months. He uses flonase with littl ebenefit.              Nasal Endoscopy: Procedure: Bilateral nasal endoscopy (CPT 00972)  ?  Indication: Anterior rhinoscopy was inadequate to evaluate pathology.  ?  Left:  Septum midline  Moderate inferior turbinate hypertrophy  Middle meatus clear, without masses, polyps, or pus  Sphenoethmoidal recess clear, without masses, polyps, or pus  ?  Right:  Septum broadly right  Moderate inferior turbinate hypertrophy  Middle meatus clear, without masses, polyps, or pus  Sphenoethmoidal recess clear, without masses, polyps, or pus.         Laryngoscopy: NPL:  Nasopharynx clear without masses  Base of tongue without masses or lesions  Vallecula clear  Pyriforms clear  Epiglottis crisp  TVFs mobile bilaterally  Glottic airway patent  Muscular pharynx and prominent BOT.   38M with PMH of hypothyroidism presents to [facility] today. RepoOSA for more than 10 years. tried CPAP and woukld take it off in the middlke in the night without      Unable to breathe and eating t the same time wit the nasal congestion.     nasal congestion R>L and recent mild epistaxis from the right. Patient notes that when he pressured along the region of the ascending process of the maxilla on the right he can feel fluid and pressure shifts. He has ANOTINE, reported AHI 16 from 2 years ago. He does not tolerate CPAP and has not worn in several months. He uses flonase with littl ebenefit.              Nasal Endoscopy: Procedure: Bilateral nasal endoscopy (CPT 84298)  ?  Indication: Anterior rhinoscopy was inadequate to evaluate pathology.  ?  Left:  Septum midline  Moderate inferior turbinate hypertrophy  Middle meatus clear, without masses, polyps, or pus  Sphenoethmoidal recess clear, without masses, polyps, or pus  ?  Right:  Septum broadly right  Moderate inferior turbinate hypertrophy  Middle meatus clear, without masses, polyps, or pus  Sphenoethmoidal recess clear, without masses, polyps, or pus.         Laryngoscopy: NPL:  Nasopharynx clear without masses  Base of tongue without masses or lesions  Vallecula clear  Pyriforms clear  Epiglottis crisp  TVFs mobile bilaterally  Glottic airway patent  Muscular pharynx and prominent BOT.   38M with PMH of hypothyroidism presents to [facility] today. Reports ANTOINE hx  for more than 10 years. Tried CPAP and would take it off  unconsciously in the middle in the night. Reports difficulty to breathe and eat at the same time with the nasal congestion. Scheduled for drug induced sleep endoscopy, septoplasty, turbinate reduction  on 11/4/2024.       Nasal Endoscopy: Procedure: Bilateral nasal endoscopy reviewed by Dr. Rosales?  Indication: Anterior rhinoscopy was inadequate to evaluate pathology.  ?  Left:  Septum midline  Moderate inferior turbinate hypertrophy  Middle meatus clear, without masses, polyps, or pus  Sphenoethmoidal recess clear, without masses, polyps, or pus  ?  Right:  Septum broadly right  Moderate inferior turbinate hypertrophy  Middle meatus clear, without masses, polyps, or pus  Sphenoethmoidal recess clear, without masses, polyps, or pus.

## 2024-10-16 NOTE — H&P PST ADULT - ASSESSMENT
38M with PMH of hypothyroidism now with deviated nasal septum scheduled for drug induced sleep endoscopy, septoplasty, turbinate reduction  on 2024.       -Medical evaluation pending per Dr. Rosales   - NPO after midnight the night before surgery except for meds   - -Educated on NSAIDS, multivitamins and herbals that increase the risk of bleeding and need to be stopped 5 days before procedure  -Educated on infection prevention  -Tylenol can be taken if needed for pain  -Will continue all other medications as prescribed  -Verbalized understanding of all instructions.        OPIOID RISK TOOL    HERBERT EACH BOX THAT APPLIES AND ADD TOTALS AT THE END    FAMILY HISTORY OF SUBSTANCE ABUSE                 FEMALE         MALE                                                Alcohol                             [  ]1 pt          [  ]3pts                                               Illegal Durgs                     [  ]2 pts        [  ]3pts                                               Rx Drugs                           [  ]4 pts        [  ]4 pts    PERSONAL HISTORY OF SUBSTANCE ABUSE                                                                                          Alcohol                             [  ]3 pts       [  ]3 pts                                               Illegal Drugs                     [  ]4 pts        [  ]4 pts                                               Rx Drugs                           [  ]5 pts        [  ]5 pts    AGE BETWEEN 16-45 YEARS                                      [  ]1 pt         [ x ]1 pt    HISTORY OF PREADOLESCENT   SEXUAL ABUSE                                                             [  ]3 pts        [  ]0pts    PSYCHOLOGICAL DISEASE                     ADD, OCD, Bipolar, Schizophrenia        [  ]2 pts         [  ]2 pts                      Depression                                               [  ]1 pt           [  ]1 pt           SCORING TOTAL   (add numbers and type here)              (**1*)                                     A score of 3 or lower indicated LOW risk for future opioid abuse  A score of 4 to 7 indicated moderate risk for future opioid abuse  A score of 8 or higher indicates a high risk for opioid abuse      CAPRINI SCORE    AGE RELATED RISK FACTORS                                                             [ ] Age 41-60 years                                            (1 Point)  [ ] Age: 61-74 years                                           (2 Points)                 [ ] Age= 75 years                                                (3 Points)             DISEASE RELATED RISK FACTORS                                                       [ ] Edema in the lower extremities                 (1 Point)                     [ ] Varicose veins                                               (1 Point)                                 [ ] BMI > 25 Kg/m2                                            (1 Point)                                  [ ] Serious infection (ie PNA)                            (1 Point)                     [ ] Lung disease ( COPD, Emphysema)            (1 Point)                                                                          [ ] Acute myocardial infarction                         (1 Point)                  [ ] Congestive heart failure (in the previous month)  (1 Point)         [ ] Inflammatory bowel disease                            (1 Point)                  [ ] Central venous access, PICC or Port               (2 points)       (within the last month)                                                                [ ] Stroke (in the previous month)                        (5 Points)    [ ] Previous or present malignancy                       (2 points)                                                                                                                                                         HEMATOLOGY RELATED FACTORS                                                         [ ] Prior episodes of VTE                                     (3 Points)                     [ ] Positive family history for VTE                      (3 Points)                  [ ] Prothrombin 75487 A                                     (3 Points)                     [ ] Factor V Leiden                                                (3 Points)                        [ ] Lupus anticoagulants                                      (3 Points)                                                           [ ] Anticardiolipin antibodies                              (3 Points)                                                       [ ] High homocysteine in the blood                   (3 Points)                                             [ ] Other congenital or acquired thrombophilia      (3 Points)                                                [ ] Heparin induced thrombocytopenia                  (3 Points)                                        MOBILITY RELATED FACTORS  [ ] Bed rest                                                         (1 Point)  [ ] Plaster cast                                                    (2 points)  [ ] Bed bound for more than 72 hours           (2 Points)    GENDER SPECIFIC FACTORS  [ ] Pregnancy or had a baby within the last month   (1 Point)  [ ] Post-partum < 6 weeks                                   (1 Point)  [ ] Hormonal therapy  or oral contraception   (1 Point)  [ ] History of pregnancy complications              (1 point)  [ ] Unexplained or recurrent              (1 Point)    OTHER RISK FACTORS                                           (1 Point)  [ ] BMI >40, smoking, diabetes requiring insulin, chemotherapy  blood transfusions and length of surgery over 2 hours    SURGERY RELATED RISK FACTORS  [ ]  Section within the last month     (1 Point)  [ ] Minor surgery                                                  (1 Point)  [ ] Arthroscopic surgery                                       (2 Points)  [ x] Planned major surgery lasting more            (2 Points)      than 45 minutes     [ ] Elective hip or knee joint replacement       (5 points)       surgery                                                TRAUMA RELATED RISK FACTORS  [ ] Fracture of the hip, pelvis, or leg                       (5 Points)  [ ] Spinal cord injury resulting in paralysis             (5 points)       (in the previous month)    [ ] Paralysis  (less than 1 month)                             (5 Points)  [ ] Multiple Trauma within 1 month                        (5 Points)    Total Score [     2   ]    Caprini Score 0-2: Low Risk, NO VTE prophylaxis required for most patients, encourage ambulation  Caprini Score 3-6: Moderate Risk , pharmacologic VTE prophylaxis is indicated for most patients (in the absence of contraindications)  Caprini Score Greater than or =7: High risk, pharmocologic VTE prophylaxis indicated for most patients (in the absence of contraindications)

## 2024-11-04 ENCOUNTER — TRANSCRIPTION ENCOUNTER (OUTPATIENT)
Age: 39
End: 2024-11-04

## 2024-11-04 ENCOUNTER — APPOINTMENT (OUTPATIENT)
Dept: OTOLARYNGOLOGY | Facility: HOSPITAL | Age: 39
End: 2024-11-04

## 2024-11-04 ENCOUNTER — OUTPATIENT (OUTPATIENT)
Dept: INPATIENT UNIT | Facility: HOSPITAL | Age: 39
LOS: 1 days | End: 2024-11-04
Payer: COMMERCIAL

## 2024-11-04 VITALS
DIASTOLIC BLOOD PRESSURE: 98 MMHG | HEART RATE: 57 BPM | RESPIRATION RATE: 14 BRPM | SYSTOLIC BLOOD PRESSURE: 158 MMHG | OXYGEN SATURATION: 100 %

## 2024-11-04 VITALS
HEIGHT: 77 IN | TEMPERATURE: 97 F | DIASTOLIC BLOOD PRESSURE: 73 MMHG | WEIGHT: 211.64 LBS | HEART RATE: 54 BPM | OXYGEN SATURATION: 100 % | SYSTOLIC BLOOD PRESSURE: 128 MMHG | RESPIRATION RATE: 16 BRPM

## 2024-11-04 DIAGNOSIS — K42.9 UMBILICAL HERNIA WITHOUT OBSTRUCTION OR GANGRENE: Chronic | ICD-10-CM

## 2024-11-04 DIAGNOSIS — Z98.890 OTHER SPECIFIED POSTPROCEDURAL STATES: Chronic | ICD-10-CM

## 2024-11-04 PROCEDURE — 30520 REPAIR OF NASAL SEPTUM: CPT

## 2024-11-04 PROCEDURE — 88311 DECALCIFY TISSUE: CPT

## 2024-11-04 PROCEDURE — 88304 TISSUE EXAM BY PATHOLOGIST: CPT

## 2024-11-04 PROCEDURE — 88311 DECALCIFY TISSUE: CPT | Mod: 26

## 2024-11-04 PROCEDURE — 30140 RESECT INFERIOR TURBINATE: CPT | Mod: 50

## 2024-11-04 PROCEDURE — C9399: CPT

## 2024-11-04 PROCEDURE — 88304 TISSUE EXAM BY PATHOLOGIST: CPT | Mod: 26

## 2024-11-04 PROCEDURE — 42975 DISE EVAL SLP DO BRTH FLX DX: CPT

## 2024-11-04 RX ORDER — ONDANSETRON HYDROCHLORIDE 2 MG/ML
4 INJECTION, SOLUTION INTRAMUSCULAR; INTRAVENOUS ONCE
Refills: 0 | Status: DISCONTINUED | OUTPATIENT
Start: 2024-11-04 | End: 2024-11-04

## 2024-11-04 RX ORDER — ACETAMINOPHEN 500 MG
975 TABLET ORAL ONCE
Refills: 0 | Status: COMPLETED | OUTPATIENT
Start: 2024-11-04 | End: 2024-11-04

## 2024-11-04 RX ORDER — CEFAZOLIN SODIUM 1 G
2000 VIAL (EA) INJECTION ONCE
Refills: 0 | Status: DISCONTINUED | OUTPATIENT
Start: 2024-11-04 | End: 2024-11-04

## 2024-11-04 RX ORDER — FENTANYL CITRAT/DEXTROSE 5%/PF 1250MCG/50
25 PATIENT CONTROLLED ANALGESIA SYRINGE INTRAVENOUS ONCE
Refills: 0 | Status: DISCONTINUED | OUTPATIENT
Start: 2024-11-04 | End: 2024-11-04

## 2024-11-04 RX ORDER — METHYLPREDNISOLONE ACETATE 80 MG/ML
1 INJECTION, SUSPENSION INTRALESIONAL; INTRAMUSCULAR; INTRASYNOVIAL; SOFT TISSUE
Qty: 1 | Refills: 0
Start: 2024-11-04

## 2024-11-04 RX ORDER — AMOXICILLIN AND CLAVULANATE POTASSIUM 600; 42.9 MG/5ML; MG/5ML
875 POWDER, FOR SUSPENSION ORAL
Qty: 14 | Refills: 0
Start: 2024-11-04 | End: 2024-11-10

## 2024-11-04 RX ORDER — OXYCODONE HYDROCHLORIDE 30 MG/1
1 TABLET ORAL
Qty: 6 | Refills: 0
Start: 2024-11-04

## 2024-11-04 RX ORDER — FENTANYL CITRAT/DEXTROSE 5%/PF 1250MCG/50
50 PATIENT CONTROLLED ANALGESIA SYRINGE INTRAVENOUS ONCE
Refills: 0 | Status: DISCONTINUED | OUTPATIENT
Start: 2024-11-04 | End: 2024-11-04

## 2024-11-04 RX ORDER — KETOROLAC TROMETHAMINE 30 MG/ML
15 INJECTION INTRAMUSCULAR; INTRAVENOUS ONCE
Refills: 0 | Status: DISCONTINUED | OUTPATIENT
Start: 2024-11-04 | End: 2024-11-04

## 2024-11-04 RX ADMIN — Medication 975 MILLIGRAM(S): at 06:24

## 2024-11-04 RX ADMIN — KETOROLAC TROMETHAMINE 15 MILLIGRAM(S): 30 INJECTION INTRAMUSCULAR; INTRAVENOUS at 09:12

## 2024-11-04 NOTE — ASU DISCHARGE PLAN (ADULT/PEDIATRIC) - ASU DC SPECIAL INSTRUCTIONSFT
-Oxycodone is for severe pain only. Alternate with tylenol and ibuprofen to stay ahead of the pain  -Refer to our hand outs regarding post operative restrictions and sinus rinses

## 2024-11-04 NOTE — ASU DISCHARGE PLAN (ADULT/PEDIATRIC) - NS MD DC FALL RISK RISK
For information on Fall & Injury Prevention, visit: [email or web address] OR  [email or web address] OR  https://www.cdc.gov/steadi/patient.html

## 2024-11-04 NOTE — ASU DISCHARGE PLAN (ADULT/PEDIATRIC) - FINANCIAL ASSISTANCE
[facility] provides services at a reduced cost to those who are determined to be eligible through [facility]’s financial assistance program. Information regarding [facility]’s financial assistance program can be found by going to [email or web address] or by calling [phone].

## 2024-11-04 NOTE — ASU DISCHARGE PLAN (ADULT/PEDIATRIC) - CARE PROVIDER_API CALL
Connor Rosales  Otolaryngology  [address] [place] [address], [address]  [place] [address]  Phone: [phone]  Fax: [phone]  Scheduled Appointment: 11/12/2024 09:00 AM

## 2024-11-12 ENCOUNTER — APPOINTMENT (OUTPATIENT)
Dept: OTOLARYNGOLOGY | Facility: CLINIC | Age: 39
End: 2024-11-12
Payer: COMMERCIAL

## 2024-11-12 VITALS
DIASTOLIC BLOOD PRESSURE: 71 MMHG | WEIGHT: 215 LBS | SYSTOLIC BLOOD PRESSURE: 118 MMHG | BODY MASS INDEX: 25.39 KG/M2 | HEART RATE: 60 BPM | HEIGHT: 77 IN

## 2024-11-12 DIAGNOSIS — G47.33 OBSTRUCTIVE SLEEP APNEA (ADULT) (PEDIATRIC): ICD-10-CM

## 2024-11-12 DIAGNOSIS — J34.2 DEVIATED NASAL SEPTUM: ICD-10-CM

## 2024-11-12 PROCEDURE — 99024 POSTOP FOLLOW-UP VISIT: CPT

## 2024-11-21 LAB — SURGICAL PATHOLOGY STUDY: SIGNIFICANT CHANGE UP

## 2024-12-10 ENCOUNTER — APPOINTMENT (OUTPATIENT)
Dept: OTOLARYNGOLOGY | Facility: CLINIC | Age: 39
End: 2024-12-10
Payer: COMMERCIAL

## 2024-12-10 VITALS
HEART RATE: 76 BPM | SYSTOLIC BLOOD PRESSURE: 106 MMHG | WEIGHT: 215 LBS | HEIGHT: 77 IN | BODY MASS INDEX: 25.39 KG/M2 | DIASTOLIC BLOOD PRESSURE: 71 MMHG

## 2024-12-10 DIAGNOSIS — J34.2 DEVIATED NASAL SEPTUM: ICD-10-CM

## 2024-12-10 DIAGNOSIS — R05.9 COUGH, UNSPECIFIED: ICD-10-CM

## 2024-12-10 DIAGNOSIS — G47.33 OBSTRUCTIVE SLEEP APNEA (ADULT) (PEDIATRIC): ICD-10-CM

## 2024-12-10 PROCEDURE — 99213 OFFICE O/P EST LOW 20 MIN: CPT | Mod: 25

## 2024-12-10 RX ORDER — BUDESONIDE 0.5 MG/2ML
0.5 INHALANT ORAL
Qty: 60 | Refills: 2 | Status: ACTIVE | COMMUNITY
Start: 2024-12-10 | End: 1900-01-01

## 2024-12-10 RX ORDER — METHYLPREDNISOLONE 4 MG/1
4 TABLET ORAL
Qty: 1 | Refills: 0 | Status: ACTIVE | COMMUNITY
Start: 2024-12-10 | End: 1900-01-01

## 2025-02-03 ENCOUNTER — EMERGENCY (EMERGENCY)
Facility: HOSPITAL | Age: 40
LOS: 1 days | Discharge: HOME | End: 2025-02-03
Attending: EMERGENCY MEDICINE
Payer: COMMERCIAL

## 2025-02-03 VITALS
DIASTOLIC BLOOD PRESSURE: 76 MMHG | TEMPERATURE: 98 F | OXYGEN SATURATION: 99 % | SYSTOLIC BLOOD PRESSURE: 134 MMHG | RESPIRATION RATE: 16 BRPM | HEART RATE: 58 BPM

## 2025-02-03 DIAGNOSIS — Z98.890 OTHER SPECIFIED POSTPROCEDURAL STATES: Chronic | ICD-10-CM

## 2025-02-03 DIAGNOSIS — K42.9 UMBILICAL HERNIA WITHOUT OBSTRUCTION OR GANGRENE: Chronic | ICD-10-CM

## 2025-02-03 LAB — S PYO DNA THROAT QL NAA+PROBE: SIGNIFICANT CHANGE UP

## 2025-02-03 PROCEDURE — 87798 DETECT AGENT NOS DNA AMP: CPT

## 2025-02-03 PROCEDURE — 99283 EMERGENCY DEPT VISIT LOW MDM: CPT

## 2025-02-03 PROCEDURE — 87651 STREP A DNA AMP PROBE: CPT

## 2025-02-03 NOTE — ED PROVIDER NOTE - NSFOLLOWUPINSTRUCTIONS_ED_ALL_ED_FT
Follow up with PCP within 1-2 days   Return if new or worsening symptoms       Viral Respiratory Infection    A viral respiratory infection is an illness that affects parts of the body used for breathing, like the lungs, nose, and throat. It is caused by a germ called a virus. Symptoms can include runny nose, coughing, sneezing, fatigue, body aches, sore throat, fever, or headache. Over the counter medicine can be used to manage the symptoms but the infection typically goes away on its own in 5 to 10 days.     SEEK IMMEDIATE MEDICAL CARE IF YOU HAVE ANY OF THE FOLLOWING SYMPTOMS: shortness of breath, chest pain, fever over 10 days, or lightheadedness/dizziness.

## 2025-02-03 NOTE — ED PROVIDER NOTE - ATTENDING APP SHARED VISIT CONTRIBUTION OF CARE
Laz: I performed a face to face bedside interview with patient regarding history of present illness, review of symptoms and past medical history. I completed an independent physical exam.  I have discussed patient's plan of care with advanced care provider.   I agree with note as stated above including HISTORY OF PRESENT ILLNESS, HIV, PAST MEDICAL/SURGICAL/FAMILY/SOCIAL HISTORY, ALLERGIES AND HOME MEDICATIONS, REVIEW OF SYSTEMS, PHYSICAL EXAM, MEDICAL DECISION MAKING and any PROGRESS NOTES during the time I functioned as the attending physician for this patient  unless otherwise noted. My brief assessment is as follows: 1 day sore throat, some congestion. +strep sick contact recently. no other complaitns. no exudate. midline uvula. strep swab, supportive care.

## 2025-02-03 NOTE — ED PROVIDER NOTE - PATIENT PORTAL LINK FT
You can access the FollowMyHealth Patient Portal offered by [facility] by registering at the following website: [email or web address]. By joining [facility]’s FollowMyHealth portal, you will also be able to view your health information using other applications (apps) compatible with our system.

## 2025-02-03 NOTE — ED PROVIDER NOTE - PHYSICAL EXAMINATION
Physical Examination:   Gen: NAD, AOx3  Head: NCAT  HEENT: EOMI, oral mucosa moist, normal conjunctiva, neck supple no exudate, mild erythema, no swelling   Lung: no respiratory distress  CV:  Normal perfusion  Abd: soft, NT ND  MSK: No edema, no visible deformities  Neuro: No focal neurologic deficits  Skin: No rash   Psych: normal affect

## 2025-02-03 NOTE — ED PROVIDER NOTE - NSTIMEPROVIDERCAREINITIATE_GEN_ER
MEDICARE WELLNESS VISIT + NOTE    CHIEF COMPLAINT:  Artur Michael presents for his Subsequent Annual Medicare Wellness Visit.   His additional complaints or concerns are addressed below.      Patient Care Team:  BRUCE Barth as PCP - General (Nurse Practitioner)  Aldair Martinez OD as Referring Provider (Optometrist - [facility])        Patient Active Problem List   Diagnosis   • Polycystic kidney disease   • History of hyperparathyroidism   • Essential hypertension   • Hypercholesterolemia   • History of parathyroidectomy (CMS/[facility])   • Hyperparathyroidism, tertiary (CMS/[facility])   • Vitamin D deficiency   • Proteinuria   • Stage 5 chronic kidney disease on chronic dialysis (CMS/[facility])   • Erectile dysfunction   • Chronic diastolic congestive heart failure (CMS/[facility])   • History of gout   • End stage renal disease (CMS/[facility])         Past Medical History:   Diagnosis Date   • Chronic renal insufficiency    • Disorder of bone and cartilage, unspecified 04/30/2009   • Essential hypertension 8/29/2013   • HLD (hyperlipidemia)    • HTN (hypertension)    • Hypercholesterolemia 8/29/2013   • Hypercholesterolemia 8/29/2013   • Hyperparathyroidism (CMS/[facility])    • Parathyroid adenoma    • Polycystic kidney disease          Past Surgical History:   Procedure Laterality Date   • Av fistula placement  01/20/2020   • Dexa bone density axial skeleton  03/14/2008   • Nasal septum surgery  01/29/2010   • Occult blood test tube  01/09/2012   • Parathyroid gland surgery           Social History     Tobacco Use   • Smoking status: Never Smoker   • Smokeless tobacco: Never Used   Substance Use Topics   • Alcohol use: No   • Drug use: No     Family History - Reviewed      Current Outpatient Medications   Medication Sig Dispense Refill   • carvedilol (COREG) 25 MG tablet Take 1 tablet by mouth 2 times daily (with meals). 180 tablet 3   • B Complex-C-Folic Acid (Dialyvite) tablet Take 1 tablet by mouth daily.      • rosuvastatin (CRESTOR) 20 MG tablet TAKE 1 TABLET BY MOUTH DAILY 90 tablet 3   • ASPIRIN PO Take 81 mg by mouth daily.     • allopurinol (ZYLOPRIM) 100 MG tablet TAKE 2 TABLETS BY MOUTH DAILY 180 tablet 1   • NIFEdipine XL (PROCARDIA XL) 90 MG 24 hr tablet TAKE 1 TABLET BY MOUTH TWICE DAILY 180 tablet 1   • sevelamer carbonate (RENVELA) 800 MG tablet Take 1 tablet by mouth 3 times daily (with meals). 270 tablet 1   • furosemide (LASIX) 80 MG tablet Take 1 tablet by mouth 2 times daily. 180 tablet 1   • Cholecalciferol (VITAMIN D) 2000 units capsule Take 2 capsules by mouth daily. 30 capsule      No current facility-administered medications for this visit.        The following items on the Medicare Health Risk Assessment were found to be positive  3.) During the past 4 weeks, how would you rate your health?: Fair     14.) During the past 4 weeks, was someone available to help if you needed and wanted help?: Yes, some         Vision and Hearing screens: Both screenings were performed and reviewed    Advance Directive:   The patient has the following documents:  Power of  for Health Care    Cognitive/Functional Status: no evidence of cognitive dysfunction by direct observation    Opioid Review: Artur is not taking opioid medications.    Recent PHQ 2/9 Score:    PHQ 2:  Date Adult PHQ 2 Score Adult PHQ 2 Interpretation   7/27/2021 0 No further screening needed       PHQ 9:  Date Adult PHQ 9 Score Adult PHQ 9 Interpretation   7/24/2020 2 Minimal Depression       DEPRESSION ASSESSMENT/PLAN:  Depression screening is negative no further plan needed.     Body mass index is 28.11 kg/m².    BMI ASSESSMENT/PLAN:  Patient BMI is within normal range.     See Patient Instructions section.   Return for MWV in 1 year, Labs 3-5 days prior.      OUTPATIENT PROGRESS NOTE    Subjective   Chief Complaint Medicare Wellness Visit    HPI Started HD in June. S/p Right arm AV fistula. PMH significant for ESRD, hyperparathyroidism,  CHF, HTN, HLD, gout. Follows regularly with nephrology and cardiology. Appt today with ortho to address left ankle concerns.     Medications  Medications were reviewed and updated today.    Histories  I have personally reviewed and updated the patient's past medical, past surgical, family and social histories during today's visit.    Review of Systems  Constitutional: No weight loss or night sweats.  HEENT: No headache or diplopia.  Cardiovascular:  No chest pain, palpitations, orthopnea or pedal edema.  Respiratory: No shortness of breath, cough, or hemoptysis.  GI:  No nausea, vomiting, diarrhea, or constipation. No hematochezia, hematemesis or hemoptysis.  Endocrine:  No polyuria, polydipsia, or polyphagia.   Musculoskeletal:  No myalgias or arthralgias. No limitation in gait.   Hematologic:  No easy bruising or bleeding.  Integument:  No rashes or abnormally dry or oily skin.  Neurologic:  No seizures, weakness or asymmetry. No abnormalities with gait.    Objective   Visit Vitals  /60 (BP Location: LUE - Left upper extremity, Patient Position: Sitting, Cuff Size: Regular)   Pulse 64   Ht 6' (1.829 m)   Wt 94 kg   SpO2 97%   BMI 28.11 kg/m²     Physical Exam  General:  Well developed. Well nourished. In no apparent distress.    HEENT:  No scleral icterus, no nystagmus. No lid lag. Mucous membranes are moist and pink without exudation.   Neck:  No thyromegaly or lymphadenopathy. No JVD.  Lungs:  Clear to auscultation in all lung fields.  Cardiovascular:  Regular rate and rhythm.   Extremities:  No edema. Strength is 5/5. SHUN AV fistula with thrill.   Neurologic:  Cranial nerves II-XII are grossly intact.    Laboratory  Component      Latest Ref Rng & Units 5/28/2021   Fasting Status      Hours 12   Sodium      135 - 145 mmol/L 146 (H)   Potassium      3.4 - 5.1 mmol/L 4.3   Chloride      98 - 107 mmol/L 106   CO2      21 - 32 mmol/L 29   ANION GAP      10 - 20 mmol/L 15   Glucose      65 - 99 mg/dL 83    BUN      6 - 20 mg/dL 88 (H)   Creatinine      0.67 - 1.17 mg/dL 5.79 (H)   Glomerular Filtration Rate      >90 mL/min/1.73m2 9 (L)   BUN/CREATININE RATIO      7 - 25 15   CALCIUM      8.4 - 10.2 mg/dL 9.4   TOTAL BILIRUBIN      0.2 - 1.0 mg/dL 0.4   AST/SGOT      <=37 Units/L 16   ALT/SGPT      <64 Units/L 18   ALK PHOSPHATASE      45 - 117 Units/L 89   Albumin      3.6 - 5.1 g/dL 3.8   TOTAL PROTEIN      6.4 - 8.2 g/dL 6.9   GLOBULIN      2.0 - 4.0 g/dL 3.1   A/G Ratio, Serum      1.0 - 2.4 1.2   WBC      4.2 - 11.0 K/mcL 8.7   RBC      4.50 - 5.90 mil/mcL 3.61 (L)   HGB      13.0 - 17.0 g/dL 11.0 (L)   HCT      39.0 - 51.0 % 33.6 (L)   MCV      78.0 - 100.0 fl 93.1   MCH      26.0 - 34.0 pg 30.5   MCHC      32.0 - 36.5 g/dL 32.7   RDW-CV      11.0 - 15.0 % 13.9   RDW-SD      39.0 - 50.0 fL 46.1   PLT      140 - 450 K/mcL 190   Neutrophil      % 55   LYMPH      % 26   MONO      % 9   EOSIN      % 9   BASO      % 1   Absolute Neutrophil      1.8 - 7.7 K/mcL 4.9   Absolute Lymph      1.0 - 4.0 K/mcL 2.3   Absolute Mono      0.3 - 0.9 K/mcL 0.8   Absolute Eos      0.0 - 0.5 K/mcL 0.8 (H)   Absolute Baso      0.0 - 0.3 K/mcL 0.1   CHOLESTEROL      <=199 mg/dL 121   TRIGLYCERIDE      <=149 mg/dL 66   HDL      >=40 mg/dL 57   CALCULATED LDL      <=129 mg/dL 51   CALCULATED NON HDL      mg/dL 64   CHOL/HDL      <=4.4 2.1   URIC ACID      3.5 - 7.2 mg/dL 6.1       Assessment & Plan   Diagnoses and associated orders for this visit:  1. Medicare annual wellness visit, subsequent  2. End stage renal disease (CMS/HCC)  3. Stage 5 chronic kidney disease on chronic dialysis (CMS/HCC)  4. History of parathyroidectomy (CMS/HCC)  5. Hyperparathyroidism, tertiary (CMS/HCC)  6. Chronic diastolic congestive heart failure (CMS/HCC)  -     Carvedilol  7. Essential hypertension  -     Carvedilol  8. Hypercholesterolemia  -     Lipid Panel Without Reflex  9. History of gout     03-Feb-2025 05:47

## 2025-02-03 NOTE — ED PROVIDER NOTE - OBJECTIVE STATEMENT
39 year old male with no med hx presented to ED c/o sore throat x 1 day, + strep contact yesterday. + congestion. denies cough, fever, abd pain, nausea. denies chest pain, sob.

## 2025-02-07 ENCOUNTER — OFFICE (OUTPATIENT)
Dept: URBAN - METROPOLITAN AREA CLINIC 103 | Facility: CLINIC | Age: 40
Setting detail: OPHTHALMOLOGY
End: 2025-02-07
Payer: COMMERCIAL

## 2025-02-07 DIAGNOSIS — H52.13: ICD-10-CM

## 2025-02-07 PROCEDURE — 99024 POSTOP FOLLOW-UP VISIT: CPT | Performed by: OPHTHALMOLOGY

## 2025-02-07 ASSESSMENT — REFRACTION_MANIFEST
OD_AXIS: 5
OS_VA1: 20/20
OD_SPHERE: -2.75
OS_SPHERE: -2.25
OS_AXIS: 180
OS_CYLINDER: -1.00
OS_SPHERE: -2.75
OD_CYLINDER: -1.00
OS_VA1: 20/20
OS_AXIS: 5
OD_VA1: 20/15
OD_SPHERE: -3.00
OD_VA1: 20/20
OS_CYLINDER: -1.00
OD_AXIS: 005
OD_CYLINDER: -1.25

## 2025-02-07 ASSESSMENT — REFRACTION_CURRENTRX
OS_VPRISM_DIRECTION: SV
OD_SPHERE: -3.00
OS_SPHERE: -2.75
OD_CYLINDER: -1.00
OD_VPRISM_DIRECTION: SV
OD_AXIS: 002
OS_CYLINDER: -1.00
OD_OVR_VA: 20/
OS_OVR_VA: 20/
OS_AXIS: 002

## 2025-02-07 ASSESSMENT — TONOMETRY
OS_IOP_MMHG: 15
OD_IOP_MMHG: 14

## 2025-02-07 ASSESSMENT — REFRACTION_AUTOREFRACTION
OD_AXIS: 139
OS_AXIS: 044
OD_SPHERE: PLANO
OS_CYLINDER: -0.75
OS_SPHERE: +0.50
OD_CYLINDER: -0.25

## 2025-02-07 ASSESSMENT — KERATOMETRY
OD_K2POWER_DIOPTERS: 40.00
OD_AXISANGLE_DEGREES: 073
OS_AXISANGLE_DEGREES: 106
OS_K1POWER_DIOPTERS: 39.75
OD_K1POWER_DIOPTERS: 39.50
OS_K2POWER_DIOPTERS: 40.25

## 2025-02-07 ASSESSMENT — SUPERFICIAL PUNCTATE KERATITIS (SPK)
OS_SPK: ABSENT
OD_SPK: ABSENT

## 2025-02-07 ASSESSMENT — PACHYMETRY
OD_CT_UM: 625
OS_CT_CORRECTION: -4
OS_CT_UM: 609
OD_CT_CORRECTION: -6

## 2025-02-07 ASSESSMENT — VISUAL ACUITY
OS_BCVA: 20/20
OD_BCVA: 20/20-2

## 2025-02-27 ENCOUNTER — EMERGENCY (EMERGENCY)
Facility: HOSPITAL | Age: 40
LOS: 1 days | Discharge: HOME | End: 2025-02-27
Attending: STUDENT IN AN ORGANIZED HEALTH CARE EDUCATION/TRAINING PROGRAM
Payer: COMMERCIAL

## 2025-02-27 VITALS
OXYGEN SATURATION: 99 % | SYSTOLIC BLOOD PRESSURE: 128 MMHG | RESPIRATION RATE: 18 BRPM | TEMPERATURE: 98 F | HEART RATE: 63 BPM | DIASTOLIC BLOOD PRESSURE: 84 MMHG | HEIGHT: 72 IN | WEIGHT: 175.05 LBS

## 2025-02-27 DIAGNOSIS — Z98.890 OTHER SPECIFIED POSTPROCEDURAL STATES: Chronic | ICD-10-CM

## 2025-02-27 DIAGNOSIS — K42.9 UMBILICAL HERNIA WITHOUT OBSTRUCTION OR GANGRENE: Chronic | ICD-10-CM

## 2025-02-27 LAB
ALBUMIN SERPL ELPH-MCNC: 3.7 G/DL — SIGNIFICANT CHANGE UP (ref 3.3–5.2)
ALP SERPL-CCNC: 59 U/L — SIGNIFICANT CHANGE UP (ref 40–120)
ALT FLD-CCNC: 23 U/L — SIGNIFICANT CHANGE UP
ANION GAP SERPL CALC-SCNC: 11 MMOL/L — SIGNIFICANT CHANGE UP (ref 5–17)
APTT BLD: 32.3 SEC — SIGNIFICANT CHANGE UP (ref 24.5–35.6)
AST SERPL-CCNC: 19 U/L — SIGNIFICANT CHANGE UP
BASOPHILS # BLD AUTO: 0.02 K/UL — SIGNIFICANT CHANGE UP (ref 0–0.2)
BASOPHILS NFR BLD AUTO: 0.3 % — SIGNIFICANT CHANGE UP (ref 0–2)
BILIRUB SERPL-MCNC: 1.2 MG/DL — SIGNIFICANT CHANGE UP (ref 0.4–2)
BLD GP AB SCN SERPL QL: SIGNIFICANT CHANGE UP
BUN SERPL-MCNC: 16 MG/DL — SIGNIFICANT CHANGE UP (ref 8–20)
CALCIUM SERPL-MCNC: 8.4 MG/DL — SIGNIFICANT CHANGE UP (ref 8.4–10.5)
CHLORIDE SERPL-SCNC: 106 MMOL/L — SIGNIFICANT CHANGE UP (ref 96–108)
CO2 SERPL-SCNC: 24 MMOL/L — SIGNIFICANT CHANGE UP (ref 22–29)
CREAT SERPL-MCNC: 0.77 MG/DL — SIGNIFICANT CHANGE UP (ref 0.5–1.3)
EGFR: 118 ML/MIN/1.73M2 — SIGNIFICANT CHANGE UP
EOSINOPHIL # BLD AUTO: 0.12 K/UL — SIGNIFICANT CHANGE UP (ref 0–0.5)
EOSINOPHIL NFR BLD AUTO: 2 % — SIGNIFICANT CHANGE UP (ref 0–6)
GLUCOSE SERPL-MCNC: 86 MG/DL — SIGNIFICANT CHANGE UP (ref 70–99)
HCT VFR BLD CALC: 44.8 % — SIGNIFICANT CHANGE UP (ref 39–50)
HGB BLD-MCNC: 14.9 G/DL — SIGNIFICANT CHANGE UP (ref 13–17)
IMM GRANULOCYTES # BLD AUTO: 0.02 K/UL — SIGNIFICANT CHANGE UP (ref 0–0.07)
IMM GRANULOCYTES NFR BLD AUTO: 0.3 % — SIGNIFICANT CHANGE UP (ref 0–0.9)
INR BLD: 1.18 RATIO — HIGH (ref 0.85–1.16)
LYMPHOCYTES # BLD AUTO: 1.36 K/UL — SIGNIFICANT CHANGE UP (ref 1–3.3)
LYMPHOCYTES NFR BLD AUTO: 22.1 % — SIGNIFICANT CHANGE UP (ref 13–44)
MAGNESIUM SERPL-MCNC: 1.9 MG/DL — SIGNIFICANT CHANGE UP (ref 1.6–2.6)
MCHC RBC-ENTMCNC: 29.7 PG — SIGNIFICANT CHANGE UP (ref 27–34)
MCHC RBC-ENTMCNC: 33.3 G/DL — SIGNIFICANT CHANGE UP (ref 32–36)
MCV RBC AUTO: 89.4 FL — SIGNIFICANT CHANGE UP (ref 80–100)
MONOCYTES # BLD AUTO: 0.5 K/UL — SIGNIFICANT CHANGE UP (ref 0–0.9)
MONOCYTES NFR BLD AUTO: 8.1 % — SIGNIFICANT CHANGE UP (ref 2–14)
NEUTROPHILS # BLD AUTO: 4.13 K/UL — SIGNIFICANT CHANGE UP (ref 1.8–7.4)
NEUTROPHILS NFR BLD AUTO: 67.2 % — SIGNIFICANT CHANGE UP (ref 43–77)
NRBC # BLD AUTO: 0 K/UL — SIGNIFICANT CHANGE UP (ref 0–0)
NRBC # FLD: 0 K/UL — SIGNIFICANT CHANGE UP (ref 0–0)
NRBC BLD AUTO-RTO: 0 /100 WBCS — SIGNIFICANT CHANGE UP (ref 0–0)
PLATELET # BLD AUTO: 228 K/UL — SIGNIFICANT CHANGE UP (ref 150–400)
PMV BLD: 10.4 FL — SIGNIFICANT CHANGE UP (ref 7–13)
POTASSIUM SERPL-MCNC: 4.1 MMOL/L — SIGNIFICANT CHANGE UP (ref 3.5–5.3)
POTASSIUM SERPL-SCNC: 4.1 MMOL/L — SIGNIFICANT CHANGE UP (ref 3.5–5.3)
PROT SERPL-MCNC: 6.3 G/DL — LOW (ref 6.6–8.7)
PROTHROM AB SERPL-ACNC: 13.7 SEC — HIGH (ref 9.9–13.4)
RBC # BLD: 5.01 M/UL — SIGNIFICANT CHANGE UP (ref 4.2–5.8)
RBC # FLD: 11.9 % — SIGNIFICANT CHANGE UP (ref 10.3–14.5)
SODIUM SERPL-SCNC: 141 MMOL/L — SIGNIFICANT CHANGE UP (ref 135–145)
WBC # BLD: 6.15 K/UL — SIGNIFICANT CHANGE UP (ref 3.8–10.5)
WBC # FLD AUTO: 6.15 K/UL — SIGNIFICANT CHANGE UP (ref 3.8–10.5)

## 2025-02-27 PROCEDURE — 86900 BLOOD TYPING SEROLOGIC ABO: CPT

## 2025-02-27 PROCEDURE — 74177 CT ABD & PELVIS W/CONTRAST: CPT | Mod: 26

## 2025-02-27 PROCEDURE — 86901 BLOOD TYPING SEROLOGIC RH(D): CPT

## 2025-02-27 PROCEDURE — 83735 ASSAY OF MAGNESIUM: CPT

## 2025-02-27 PROCEDURE — 99283 EMERGENCY DEPT VISIT LOW MDM: CPT

## 2025-02-27 PROCEDURE — 36415 COLL VENOUS BLD VENIPUNCTURE: CPT

## 2025-02-27 PROCEDURE — 99285 EMERGENCY DEPT VISIT HI MDM: CPT

## 2025-02-27 PROCEDURE — 85025 COMPLETE CBC W/AUTO DIFF WBC: CPT

## 2025-02-27 PROCEDURE — 99284 EMERGENCY DEPT VISIT MOD MDM: CPT | Mod: 25

## 2025-02-27 PROCEDURE — 80053 COMPREHEN METABOLIC PANEL: CPT

## 2025-02-27 PROCEDURE — 86850 RBC ANTIBODY SCREEN: CPT

## 2025-02-27 PROCEDURE — 85610 PROTHROMBIN TIME: CPT

## 2025-02-27 PROCEDURE — 74177 CT ABD & PELVIS W/CONTRAST: CPT | Mod: MC

## 2025-02-27 PROCEDURE — 85730 THROMBOPLASTIN TIME PARTIAL: CPT

## 2025-02-27 RX ORDER — SODIUM CHLORIDE 9 G/1000ML
1000 INJECTION, SOLUTION INTRAVENOUS ONCE
Refills: 0 | Status: COMPLETED | OUTPATIENT
Start: 2025-02-27 | End: 2025-02-27

## 2025-02-27 RX ADMIN — SODIUM CHLORIDE 1000 MILLILITER(S): 9 INJECTION, SOLUTION INTRAVENOUS at 11:24

## 2025-02-27 NOTE — ED PROVIDER NOTE - CLINICAL SUMMARY MEDICAL DECISION MAKING FREE TEXT BOX
Concern for gastroenteritis versus possible appendicitis.  No testicular pain to suggest torsion. No LLQ pain to suggest diverticulitis.  No urinary symptoms to suggest cystitis or pyelonephritis.  Plan on lab work, imaging.  Patient declining pain medication at this time.

## 2025-02-27 NOTE — ED ADULT NURSE NOTE - OBJECTIVE STATEMENT
38 YO male presented to the Ed with c/o RLQ abdominal pain associated with intermittent diarrhea since Friday. Patient stated symptoms did not resolve that made him come to the ED .Patient had  fever 5 days ago, resolved after that.  Patient denies nauseas, vomiting, chest pain, SOB or urinary symptoms. Blood work sent, awaiting Ct scan.

## 2025-02-27 NOTE — ED PROVIDER NOTE - PROGRESS NOTE DETAILS
Patient reevaluated, feeling better here.  Workup negative for acute pathology.  Given he is feeling well, we will plan for discharge.  Patient to follow-up as an outpatient as needed.  Plan to discharge patient. Return to ED precautions were discussed with the patient/family. All questions were answered. Wade Quinn MD. I did initially discussed the case with surgical resident who states that he would come to see patient.  I spoke with the patient, states that he spoke to the surgical resident who also told him that his CAT scan was normal.  Wade Quinn MD.

## 2025-02-27 NOTE — ED PROVIDER NOTE - PHYSICAL EXAMINATION
Constitutional: Awake, Alert, non-toxic. No acute distress.  HEAD: Normocephalic, atraumatic.   EYES: PERRL, EOM intact, conjunctiva and sclera are clear bilaterally.  ENT: External ears normal. No rhinorrhea, no tracheal deviation   NECK: Supple, non-tender  CARDIOVASCULAR: regular rate and rhythm.  RESPIRATORY: Normal respiratory effort; breath sounds CTAB, no wheezes, rhonchi, or rales. Speaking in full sentences. No accessory muscle use.   ABDOMEN: Soft; suprapubic and RLQ TTP, non-distended. No rebound or guarding.   MSK:  no lower extremity edema, no deformities  SKIN: Warm, dry  NEURO: A&O x3. Sensory and motor functions are grossly intact. Speech is normal. No facial droop.  PSYCH: Appearance and judgement seem appropriate for gender and age.

## 2025-02-27 NOTE — ED ADULT TRIAGE NOTE - CHIEF COMPLAINT QUOTE
pt c/o of RLQ abdominal pain since Friday associated with n/v/d. now c/o of pelvic pain. concern for appendicitis.

## 2025-02-27 NOTE — CONSULT NOTE ADULT - SUBJECTIVE AND OBJECTIVE BOX
Subjective: Patient with a past medical history of hypothyroidism is presenting with lower abdominal pain, diarrhea and fevers.  States that he had fever 5 days ago that has since resolved on its own.  Otherwise has been having lower abdominal pain with intermittent diarrhea.  Pain has radiated towards his right lower quadrant.  Given the persistent symptoms, came to ED for evaluation for rule out appendicitis.  Denies chest pain, shortness of breath, urinary symptoms. No recent travel or sick contacts. Does work in healthcare .           Vital Signs Last 24 Hrs  T(C): 36.4 (27 Feb 2025 09:58), Max: 36.4 (27 Feb 2025 09:58)  T(F): 97.6 (27 Feb 2025 09:58), Max: 97.6 (27 Feb 2025 09:58)  HR: 63 (27 Feb 2025 09:58) (63 - 63)  BP: 128/84 (27 Feb 2025 09:58) (128/84 - 128/84)  BP(mean): --  RR: 18 (27 Feb 2025 09:58) (18 - 18)  SpO2: 99% (27 Feb 2025 09:58) (99% - 99%)    Parameters below as of 27 Feb 2025 09:58  Patient On (Oxygen Delivery Method): room air        Physical Exam:    Constitutional: NAD  HEENT: PERRL, EOMI  Neck: No JVD, FROM without pain  Respiratory: Breath Sounds equal & clear to auscultation, no accessory muscle use  Cardiovascular: Regular rate & rhythm, S1, S2  Gastrointestinal: Soft, non distended diffuse   Extremities: No peripheral edema, No cyanosis  Neurological: A&O x 3; without gross deficit, GCS: 15  Musculoskeletal: No joint pain, swelling, deformity, or point tenderness; no limitation of movement      LABS:                        14.9   6.15  )-----------( 228      ( 27 Feb 2025 11:15 )             44.8     02-27    141  |  106  |  16.0  ----------------------------<  86  4.1   |  24.0  |  0.77    Ca    8.4      27 Feb 2025 11:15  Mg     1.9     02-27    TPro  6.3[L]  /  Alb  3.7  /  TBili  1.2  /  DBili  x   /  AST  19  /  ALT  23  /  AlkPhos  59  02-27    PT/INR - ( 27 Feb 2025 11:15 )   PT: 13.7 sec;   INR: 1.18 ratio         PTT - ( 27 Feb 2025 11:15 )  PTT:32.3 sec  Urinalysis Basic - ( 27 Feb 2025 11:15 )    Color: x / Appearance: x / SG: x / pH: x  Gluc: 86 mg/dL / Ketone: x  / Bili: x / Urobili: x   Blood: x / Protein: x / Nitrite: x   Leuk Esterase: x / RBC: x / WBC x   Sq Epi: x / Non Sq Epi: x / Bacteria: x

## 2025-02-27 NOTE — ED PROVIDER NOTE - OBJECTIVE STATEMENT
Patient with a past medical history of hypothyroidism is presenting with lower abdominal pain, diarrhea and fevers.  States that he had fever 5 days ago that has since resolved on its own.  Otherwise has been having lower abdominal pain with intermittent diarrhea.  Pain has radiated towards his right lower quadrant.  Given the persistent symptoms, came to ED for evaluation for rule out appendicitis.  Denies chest pain, shortness of breath, upper abdominal pain.

## 2025-02-27 NOTE — CONSULT NOTE ADULT - ASSESSMENT
a/p 39M with abd pain and diarrhea, no leukocytosis and CT without any abdominal pathology     hemodynamically stable   no acute surgical intervention   supportive and symptomatic care   no need for surgical outpatient follow up   ada morales and patient

## 2025-02-27 NOTE — ED PROVIDER NOTE - PATIENT PORTAL LINK FT
You can access the FollowMyHealth Patient Portal offered by [facility] by registering at the following website: [email or web address]. By joining [employer]’s FollowMyHealth portal, you will also be able to view your health information using other applications (apps) compatible with our system.

## 2025-04-22 ENCOUNTER — EMERGENCY (EMERGENCY)
Facility: HOSPITAL | Age: 40
LOS: 1 days | End: 2025-04-22
Attending: EMERGENCY MEDICINE
Payer: COMMERCIAL

## 2025-04-22 VITALS
TEMPERATURE: 98 F | RESPIRATION RATE: 20 BRPM | SYSTOLIC BLOOD PRESSURE: 116 MMHG | DIASTOLIC BLOOD PRESSURE: 73 MMHG | OXYGEN SATURATION: 98 % | HEART RATE: 57 BPM | WEIGHT: 214.95 LBS | HEIGHT: 72 IN

## 2025-04-22 DIAGNOSIS — Z98.890 OTHER SPECIFIED POSTPROCEDURAL STATES: Chronic | ICD-10-CM

## 2025-04-22 DIAGNOSIS — K42.9 UMBILICAL HERNIA WITHOUT OBSTRUCTION OR GANGRENE: Chronic | ICD-10-CM

## 2025-04-22 PROCEDURE — 99283 EMERGENCY DEPT VISIT LOW MDM: CPT | Mod: 25

## 2025-04-22 PROCEDURE — 90715 TDAP VACCINE 7 YRS/> IM: CPT

## 2025-04-22 PROCEDURE — 99283 EMERGENCY DEPT VISIT LOW MDM: CPT

## 2025-04-22 PROCEDURE — 90471 IMMUNIZATION ADMIN: CPT

## 2025-04-22 RX ORDER — CLOSTRIDIUM TETANI TOXOID ANTIGEN (FORMALDEHYDE INACTIVATED), CORYNEBACTERIUM DIPHTHERIAE TOXOID ANTIGEN (FORMALDEHYDE INACTIVATED), BORDETELLA PERTUSSIS TOXOID ANTIGEN (GLUTARALDEHYDE INACTIVATED), BORDETELLA PERTUSSIS FILAMENTOUS HEMAGGLUTININ ANTIGEN (FORMALDEHYDE INACTIVATED), BORDETELLA PERTUSSIS PERTACTIN ANTIGEN, AND BORDETELLA PERTUSSIS FIMBRIAE 2/3 ANTIGEN 5; 2; 2.5; 5; 3; 5 [LF]/.5ML; [LF]/.5ML; UG/.5ML; UG/.5ML; UG/.5ML; UG/.5ML
0.5 INJECTION, SUSPENSION INTRAMUSCULAR ONCE
Refills: 0 | Status: COMPLETED | OUTPATIENT
Start: 2025-04-22 | End: 2025-04-22

## 2025-04-22 RX ADMIN — CLOSTRIDIUM TETANI TOXOID ANTIGEN (FORMALDEHYDE INACTIVATED), CORYNEBACTERIUM DIPHTHERIAE TOXOID ANTIGEN (FORMALDEHYDE INACTIVATED), BORDETELLA PERTUSSIS TOXOID ANTIGEN (GLUTARALDEHYDE INACTIVATED), BORDETELLA PERTUSSIS FILAMENTOUS HEMAGGLUTININ ANTIGEN (FORMALDEHYDE INACTIVATED), BORDETELLA PERTUSSIS PERTACTIN ANTIGEN, AND BORDETELLA PERTUSSIS FIMBRIAE 2/3 ANTIGEN 0.5 MILLILITER(S): 5; 2; 2.5; 5; 3; 5 INJECTION, SUSPENSION INTRAMUSCULAR at 11:26
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
## 2025-04-22 NOTE — ED ADULT NURSE NOTE - DOES PATIENT HAVE ADVANCE DIRECTIVE
Repatha Pending    Insurance response  Prescription Drug Insurance: [facility]  Notes: Prior authorization submitted - will update provider when decision has been made by insurance.   Cover My Meds Key: [id number]       No

## 2025-04-22 NOTE — ED PROVIDER NOTE - PATIENT PORTAL LINK FT
You can access the FollowMyHealth Patient Portal offered by [facility] by registering at the following website: [email or web address]. By joining [employer]’s FollowMyHealth portal, you will also be able to view your health information using other applications (apps) compatible with our system.
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
## 2025-04-22 NOTE — ED ADULT NURSE REASSESSMENT NOTE - NS ED NURSE REASSESS COMMENT FT1
Blood work drawn as per protocol for exposure, paperwork faxed to S, blood walked down to lab, tetanus given prior to d/c.
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
## 2025-04-22 NOTE — ED PROVIDER NOTE - NSFOLLOWUPINSTRUCTIONS_ED_ALL_ED_FT
1) Please follow-up with your primary care doctor in the next 5-7 days.  Please call tomorrow for an appointment.  If you cannot follow-up with your primary care doctor please return to the ED for any urgent issues.  2) You were given a copy of the tests performed today.  Please bring the results with you and review them with your primary care doctor.  3) If you have any worsening of symptoms or any other concerns please return to the ED immediately.  4) Please continue taking your home medications as directed.     Follow up with [facility] at 1 month, 3 months and 6 months

## (undated) DEVICE — ABDOMINAL BINDER MED/LG 12" X 45"-62"

## (undated) DEVICE — SPECIMEN CONTAINER 100ML

## (undated) DEVICE — SOL IRR POUR NS 0.9% 500ML

## (undated) DEVICE — PACK MINOR WITH LAP

## (undated) DEVICE — DRSG TELFA 3 X 8

## (undated) DEVICE — SUT ETHILON 3-0 18" FS-1

## (undated) DEVICE — DRAPE TOWEL BLUE 17" X 24"

## (undated) DEVICE — SYR LUER LOK 10CC

## (undated) DEVICE — DRAPE INSTRUMENT POUCH 6.75" X 11"

## (undated) DEVICE — STAPLER COVIDIEN ENDO GIA SHORT HANDLE

## (undated) DEVICE — TUBING SUCTION NONCONDUCTIVE 6MM X 12FT

## (undated) DEVICE — GLV 7 PROTEXIS (WHITE)

## (undated) DEVICE — LABELS BLANK W PEN

## (undated) DEVICE — NDL HYPO REGULAR BEVEL 22G X 1.5" (TURQUOISE)

## (undated) DEVICE — VENODYNE/SCD SLEEVE CALF LARGE

## (undated) DEVICE — GLV 8 PROTEXIS (WHITE)

## (undated) DEVICE — MARKING PEN W RULER

## (undated) DEVICE — Device

## (undated) DEVICE — TRAP SPECIMEN SPUTUM 40CC

## (undated) DEVICE — DRSG TELFA 3 X 4

## (undated) DEVICE — GLV 6.5 PROTEXIS (WHITE)

## (undated) DEVICE — WARMING BLANKET FULL ADULT

## (undated) DEVICE — SYR LUER LOK 5CC

## (undated) DEVICE — BLADE MEDTRONIC ENT TRICUT ROTATABLE STRAIGHT 4MM X 11CM

## (undated) DEVICE — POSITIONER FOAM EGG CRATE ULNAR 2PCS (PINK)

## (undated) DEVICE — GOWN TRIMAX LG

## (undated) DEVICE — DRAPE SPLIT SHEET 77" X 108"

## (undated) DEVICE — DRSG TEGADERM 6"X8"

## (undated) DEVICE — BLADE MEDTRONIC ENT INFERIOR TURBINATE ROTATABLE STRAIGHT 2.9MM X 11CM

## (undated) DEVICE — VENODYNE/SCD SLEEVE CALF MEDIUM

## (undated) DEVICE — SUT CHROMIC 4-0 27" RB-1

## (undated) DEVICE — BLADE SCALPEL SAFETYLOCK #15

## (undated) DEVICE — LONE STAR ELASTIC STAY HOOK 5MM SHARP

## (undated) DEVICE — ABDOMINAL BINDER XL 9" X 62"-74"

## (undated) DEVICE — LIGASURE IMPACT

## (undated) DEVICE — PACK MAJOR ABDOMINAL SUPINE

## (undated) DEVICE — ELCTR ENT BOVIE SUCTION 10FR 6"

## (undated) DEVICE — SOL ANTI FOG

## (undated) DEVICE — LONE STAR RETRACTOR SQUARE 14.1CMX14.1CM DISP

## (undated) DEVICE — GLV 7.5 PROTEXIS (WHITE)

## (undated) DEVICE — WARMING BLANKET UPPER ADULT

## (undated) DEVICE — GLV 8.5 PROTEXIS (WHITE)

## (undated) DEVICE — NDL HYPO REGULAR BEVEL 25G X 1.5" (BLUE)

## (undated) DEVICE — SOL IRR POUR H2O 250ML

## (undated) DEVICE — DRSG CURITY GAUZE SPONGE 4 X 4" 12-PLY

## (undated) DEVICE — BLADE SCALPEL SAFETYLOCK #10

## (undated) DEVICE — MEDICATION LABELS W MARKER

## (undated) DEVICE — STAPLER SKIN VISI-STAT 35 WIDE

## (undated) DEVICE — DRSG STERISTRIPS 0.5 X 4"

## (undated) DEVICE — CLEANING SHEATH ENDO-SCRUB FOR STORZ 7210AA TELESCOPE 4MM 0 DEGREE